# Patient Record
Sex: MALE | Race: WHITE | Employment: UNEMPLOYED | URBAN - METROPOLITAN AREA
[De-identification: names, ages, dates, MRNs, and addresses within clinical notes are randomized per-mention and may not be internally consistent; named-entity substitution may affect disease eponyms.]

---

## 2020-02-02 ENCOUNTER — HOSPITAL ENCOUNTER (EMERGENCY)
Age: 50
Discharge: HOME | End: 2020-02-02
Attending: EMERGENCY MEDICINE
Payer: COMMERCIAL

## 2020-02-02 ENCOUNTER — APPOINTMENT (OUTPATIENT)
Dept: GENERAL RADIOLOGY | Age: 50
End: 2020-02-02
Payer: COMMERCIAL

## 2020-02-02 VITALS
RESPIRATION RATE: 18 BRPM | WEIGHT: 235 LBS | HEART RATE: 92 BPM | SYSTOLIC BLOOD PRESSURE: 148 MMHG | BODY MASS INDEX: 31.83 KG/M2 | HEIGHT: 72 IN | DIASTOLIC BLOOD PRESSURE: 93 MMHG | TEMPERATURE: 98.6 F | OXYGEN SATURATION: 95 %

## 2020-02-02 PROCEDURE — 96372 THER/PROPH/DIAG INJ SC/IM: CPT

## 2020-02-02 PROCEDURE — 99283 EMERGENCY DEPT VISIT LOW MDM: CPT

## 2020-02-02 PROCEDURE — 6360000002 HC RX W HCPCS: Performed by: EMERGENCY MEDICINE

## 2020-02-02 PROCEDURE — 71045 X-RAY EXAM CHEST 1 VIEW: CPT

## 2020-02-02 RX ORDER — GABAPENTIN 300 MG/1
300 CAPSULE ORAL NIGHTLY
COMMUNITY
End: 2021-05-19 | Stop reason: CLARIF

## 2020-02-02 RX ORDER — KETOROLAC TROMETHAMINE 30 MG/ML
60 INJECTION, SOLUTION INTRAMUSCULAR; INTRAVENOUS ONCE
Status: COMPLETED | OUTPATIENT
Start: 2020-02-02 | End: 2020-02-02

## 2020-02-02 RX ORDER — OMEPRAZOLE 20 MG/1
40 CAPSULE, DELAYED RELEASE ORAL DAILY
COMMUNITY
End: 2021-05-19

## 2020-02-02 RX ADMIN — KETOROLAC TROMETHAMINE 60 MG: 30 INJECTION, SOLUTION INTRAMUSCULAR; INTRAVENOUS at 04:30

## 2020-02-02 SDOH — HEALTH STABILITY: MENTAL HEALTH: HOW OFTEN DO YOU HAVE A DRINK CONTAINING ALCOHOL?: NEVER

## 2020-02-02 ASSESSMENT — PAIN DESCRIPTION - PAIN TYPE: TYPE: ACUTE PAIN

## 2020-02-02 ASSESSMENT — PAIN SCALES - GENERAL: PAINLEVEL_OUTOF10: 6

## 2020-02-02 NOTE — ED PROVIDER NOTES
Triage Chief Complaint:   Cough; Chest Pain; Fever; and Nasal Congestion    [place]:  Chick Phoenix is a 54 y.o. male that presents with 2 days of throbbing headache, fever, cough, congestion, body aches. Recent sick contact at work with similar symptoms. Denies any neck pain, vision changes, motor or sensory deficits. States that he does have some chest soreness with coughing. Denies any abdominal pain, vomiting, diarrhea. He has had fevers up to 102 °F and took Tylenol prior to arrival.    ROS:  At least 14 systems reviewed and otherwise acutely negative except as in the [address] History reviewed. No pertinent past medical history. History reviewed. No pertinent surgical history. History reviewed. No pertinent family history.   Social History     Socioeconomic History    Marital status: Single     Spouse name: Not on file    Number of children: Not on file    Years of education: Not on file    Highest education level: Not on file   Occupational History    Not on file   Social Needs    Financial resource strain: Not on file    Food insecurity:     Worry: Not on file     Inability: Not on file    Transportation needs:     Medical: Not on file     Non-medical: Not on file   Tobacco Use    Smoking status: Former Smoker   Substance and Sexual Activity    Alcohol use: Never     Frequency: Never    Drug use: Not on file    Sexual activity: Not on file   Lifestyle    Physical activity:     Days per week: Not on file     Minutes per session: Not on file    Stress: Not on file   Relationships    Social connections:     Talks on phone: Not on file     Gets together: Not on file     Attends [employer] service: Not on file     Active member of club or organization: Not on file     Attends meetings of clubs or organizations: Not on file     Relationship status: Not on file    Intimate partner violence:     Fear of current or ex partner: Not on file     Emotionally abused: Not on file     Physically abused: Not on file     Forced sexual activity: Not on file   Other Topics Concern    Not on file   Social History Narrative    Not on file     No current facility-administered medications for this encounter. Current Outpatient Medications   Medication Sig Dispense Refill    gabapentin (NEURONTIN) 300 MG capsule Take 300 mg by mouth nightly.  omeprazole (PRILOSEC) 20 MG delayed release capsule Take 40 mg by mouth daily      Fluticasone Propionate (FLONASE NA) by Nasal route       Allergies   Allergen Reactions    Dye [Iodides]        Nursing Notes Reviewed    Physical Exam:  ED Triage Vitals [02/02/20 0400]   Enc Vitals Group      BP (!) 148/93      Pulse 92      Resp 18      Temp 98.6 °F (37 °C)      Temp src       SpO2 95 %      Weight 235 lb (106.6 kg)      Height 6' (1.829 m)      Head Circumference       Peak Flow       Pain Score       Pain Loc       Pain Edu? Excl. in [address]? GENERAL APPEARANCE: Awake and alert. Cooperative. No acute distress. HEAD: Normocephalic. Atraumatic. EYES: EOM's grossly intact. Sclera anicteric. ENT: Mucous membranes are moist. Tolerates saliva. No trismus. Posterior pharyngeal erythema without exudate or asymmetry  NECK: Supple. No meningismus. Trachea midline. HEART: RRR. Radial pulses 2+. LUNGS: Respirations unlabored. CTAB  ABDOMEN: Soft. Non-tender. No guarding or rebound. EXTREMITIES: No acute deformities. SKIN: Warm and dry. NEUROLOGICAL: No gross facial drooping. Moves all 4 extremities spontaneously. PSYCHIATRIC: Normal mood. I have reviewed and interpreted all of the currently available lab results from this visit (if applicable):  No results found for this visit on 02/02/20.    Radiographs (if obtained):  [] The following radiograph was interpreted by myself in the absence of a radiologist:  [x] Radiologist's Report Reviewed:    EKG (if obtained): (All EKG's are interpreted by myself in the absence of a cardiologist)    MDM:  Plan of care is discussed thoroughly with the patient and family if present. If performed, all imaging and lab work also discussed with patient. All relevant prior results and chart reviewed if available. Presents as above. He is in no acute distress and has normal vital signs. Presentation most consistent with likely viral URI, possibly influenza. Chest x-ray shows no evidence of pneumonia. Patient was given dose of Toradol here. At this time based on overall presentation, I have low suspicion for meningitis, encephalitis, PTA, other acute life-threatening process. Patient instructed to stay hydrated, take ibuprofen and Tylenol at home over the next few days. He is agreeable with this plan of care. Clinical Impression:  1. Cough    2. Acute febrile illness    3.  Myalgia      (Please note that portions of this note may have been completed with a voice recognition program. Efforts were made to edit the dictations but occasionally words are mis-transcribed.)    MD Marley Zavaleta MD  02/02/20 5636

## 2020-07-09 ENCOUNTER — HOSPITAL ENCOUNTER (OUTPATIENT)
Dept: GENERAL RADIOLOGY | Age: 50
Discharge: HOME | End: 2020-07-09
Payer: COMMERCIAL

## 2020-07-09 ENCOUNTER — HOSPITAL ENCOUNTER (OUTPATIENT)
Age: 50
Discharge: HOME | End: 2020-07-09
Payer: COMMERCIAL

## 2020-07-09 PROCEDURE — 72100 X-RAY EXAM L-S SPINE 2/3 VWS: CPT

## 2020-07-09 PROCEDURE — 71120 X-RAY EXAM BREASTBONE 2/>VWS: CPT

## 2020-07-09 PROCEDURE — 71046 X-RAY EXAM CHEST 2 VIEWS: CPT

## 2020-08-06 NOTE — PROGRESS NOTES
Pt already has a Covid test scheduled for 8/11 at 8:50. Reminded pt not to have caffeine, smoke or any breathing medications.

## 2020-08-10 ENCOUNTER — HOSPITAL ENCOUNTER (OUTPATIENT)
Dept: LAB | Age: 50
Discharge: HOME | End: 2020-08-10
Payer: COMMERCIAL

## 2020-08-10 PROCEDURE — U0002 COVID-19 LAB TEST NON-CDC: HCPCS

## 2020-08-11 LAB
SARS-COV-2: NOT DETECTED
SOURCE: NORMAL

## 2020-08-13 ENCOUNTER — HOSPITAL ENCOUNTER (OUTPATIENT)
Dept: CT IMAGING | Age: 50
Discharge: HOME | End: 2020-08-13
Payer: COMMERCIAL

## 2020-08-13 ENCOUNTER — HOSPITAL ENCOUNTER (OUTPATIENT)
Dept: PULMONOLOGY | Age: 50
Discharge: HOME | End: 2020-08-13
Payer: COMMERCIAL

## 2020-08-13 LAB
DLCO %PRED: 98 %
DLCO PRED: NORMAL
DLCO/VA %PRED: NORMAL
DLCO/VA PRED: NORMAL
DLCO/VA: NORMAL
DLCO: NORMAL
EXPIRATORY TIME-POST: NORMAL
EXPIRATORY TIME: NORMAL
FEF 25-75% %CHNG: NORMAL
FEF 25-75% %PRED-POST: NORMAL
FEF 25-75% %PRED-PRE: NORMAL
FEF 25-75% PRED: NORMAL
FEF 25-75%-POST: NORMAL
FEF 25-75%-PRE: NORMAL
FEV1 %PRED-POST: 99 %
FEV1 %PRED-PRE: 95 %
FEV1 PRED: NORMAL
FEV1-POST: NORMAL
FEV1-PRE: NORMAL
FEV1/FVC %PRED-POST: NORMAL
FEV1/FVC %PRED-PRE: NORMAL
FEV1/FVC PRED: NORMAL
FEV1/FVC-POST: 84 %
FEV1/FVC-PRE: 80 %
FVC %PRED-POST: NORMAL
FVC %PRED-PRE: NORMAL
FVC PRED: NORMAL
FVC-POST: NORMAL
FVC-PRE: NORMAL
GAW %PRED: NORMAL
GAW PRED: NORMAL
GAW: NORMAL
IC %PRED: NORMAL
IC PRED: NORMAL
IC: NORMAL
MEP: NORMAL
MIP: NORMAL
MVV %PRED-PRE: NORMAL
MVV PRED: NORMAL
MVV-PRE: NORMAL
PEF %PRED-POST: NORMAL
PEF %PRED-PRE: NORMAL
PEF PRED: NORMAL
PEF%CHNG: NORMAL
PEF-POST: NORMAL
PEF-PRE: NORMAL
RAW %PRED: NORMAL
RAW PRED: NORMAL
RAW: NORMAL
RV %PRED: NORMAL
RV PRED: NORMAL
RV: NORMAL
SVC %PRED: NORMAL
SVC PRED: NORMAL
SVC: NORMAL
TLC %PRED: 101 %
TLC PRED: NORMAL
TLC: NORMAL
VA %PRED: NORMAL
VA PRED: NORMAL
VA: NORMAL
VTG %PRED: NORMAL
VTG PRED: NORMAL
VTG: NORMAL

## 2020-08-13 PROCEDURE — 94726 PLETHYSMOGRAPHY LUNG VOLUMES: CPT

## 2020-08-13 PROCEDURE — 94060 EVALUATION OF WHEEZING: CPT

## 2020-08-13 PROCEDURE — 94727 GAS DIL/WSHOT DETER LNG VOL: CPT

## 2020-08-13 PROCEDURE — 70486 CT MAXILLOFACIAL W/O DYE: CPT

## 2020-08-13 ASSESSMENT — PULMONARY FUNCTION TESTS
FEV1_PERCENT_PREDICTED_PRE: 95
FEV1/FVC_POST: 84
FEV1/FVC_PRE: 80
FEV1_PERCENT_PREDICTED_POST: 99

## 2021-05-26 ENCOUNTER — HOSPITAL ENCOUNTER (OUTPATIENT)
Dept: CT IMAGING | Age: 51
Discharge: HOME | End: 2021-05-26
Payer: COMMERCIAL

## 2021-05-26 DIAGNOSIS — J84.9 ILD (INTERSTITIAL LUNG DISEASE) (HCC): ICD-10-CM

## 2021-05-26 PROCEDURE — 71250 CT THORAX DX C-: CPT

## 2021-06-01 ENCOUNTER — HOSPITAL ENCOUNTER (OUTPATIENT)
Age: 51
Discharge: HOME | End: 2021-06-01
Payer: COMMERCIAL

## 2021-06-01 DIAGNOSIS — J45.20 MILD INTERMITTENT ASTHMA, UNSPECIFIED WHETHER COMPLICATED: ICD-10-CM

## 2021-06-01 LAB
BASOPHILS ABSOLUTE: 0.1 K/CU MM
BASOPHILS RELATIVE PERCENT: 0.5 % (ref 0–1)
DIFFERENTIAL TYPE: ABNORMAL
EOSINOPHILS ABSOLUTE: 0.2 K/CU MM
EOSINOPHILS RELATIVE PERCENT: 1.6 % (ref 0–3)
HCT VFR BLD CALC: 46.7 % (ref 42–52)
HEMOGLOBIN: 15.5 GM/DL (ref 13.5–18)
IMMATURE NEUTROPHIL %: 0.5 % (ref 0–0.43)
LYMPHOCYTES ABSOLUTE: 2.7 K/CU MM
LYMPHOCYTES RELATIVE PERCENT: 28.8 % (ref 24–44)
MCH RBC QN AUTO: 32.6 PG (ref 27–31)
MCHC RBC AUTO-ENTMCNC: 33.2 % (ref 32–36)
MCV RBC AUTO: 98.3 FL (ref 78–100)
MONOCYTES ABSOLUTE: 0.8 K/CU MM
MONOCYTES RELATIVE PERCENT: 7.9 % (ref 0–4)
NUCLEATED RBC %: 0 %
PDW BLD-RTO: 12.1 % (ref 11.7–14.9)
PLATELET # BLD: 222 K/CU MM (ref 140–440)
PMV BLD AUTO: 12 FL (ref 7.5–11.1)
RBC # BLD: 4.75 M/CU MM (ref 4.6–6.2)
SEGMENTED NEUTROPHILS ABSOLUTE COUNT: 5.7 K/CU MM
SEGMENTED NEUTROPHILS RELATIVE PERCENT: 60.7 % (ref 36–66)
TOTAL IMMATURE NEUTOROPHIL: 0.05 K/CU MM
TOTAL NUCLEATED RBC: 0 K/CU MM
WBC # BLD: 9.5 K/CU MM (ref 4–10.5)

## 2021-06-01 PROCEDURE — 82785 ASSAY OF IGE: CPT

## 2021-06-01 PROCEDURE — 36415 COLL VENOUS BLD VENIPUNCTURE: CPT

## 2021-06-01 PROCEDURE — 85025 COMPLETE CBC W/AUTO DIFF WBC: CPT

## 2021-06-05 LAB — IMMUNOGLOBULIN E: 48 KU/L

## 2021-06-14 ENCOUNTER — HOSPITAL ENCOUNTER (OUTPATIENT)
Dept: SLEEP CENTER | Age: 51
Discharge: HOME | End: 2021-06-14
Payer: COMMERCIAL

## 2021-06-14 DIAGNOSIS — R06.83 SNORING: ICD-10-CM

## 2021-06-14 DIAGNOSIS — G47.10 HYPERSOMNOLENCE: ICD-10-CM

## 2021-06-14 PROCEDURE — 95810 POLYSOM 6/> YRS 4/> PARAM: CPT

## 2021-06-14 ASSESSMENT — SLEEP AND FATIGUE QUESTIONNAIRES
HOW LIKELY ARE YOU TO NOD OFF OR FALL ASLEEP WHILE LYING DOWN TO REST IN THE AFTERNOON WHEN CIRCUMSTANCES PERMIT: 3
HOW LIKELY ARE YOU TO NOD OFF OR FALL ASLEEP WHILE SITTING AND TALKING TO SOMEONE: 2
HOW LIKELY ARE YOU TO NOD OFF OR FALL ASLEEP WHILE SITTING AND READING: 3
HOW LIKELY ARE YOU TO NOD OFF OR FALL ASLEEP WHILE SITTING QUIETLY AFTER LUNCH WITHOUT ALCOHOL: 2
HOW LIKELY ARE YOU TO NOD OFF OR FALL ASLEEP WHEN YOU ARE A PASSENGER IN A CAR FOR AN HOUR WITHOUT A BREAK: 1
HOW LIKELY ARE YOU TO NOD OFF OR FALL ASLEEP WHILE SITTING INACTIVE IN A PUBLIC PLACE: 2
ESS TOTAL SCORE: 17
HOW LIKELY ARE YOU TO NOD OFF OR FALL ASLEEP IN A CAR, WHILE STOPPED FOR A FEW MINUTES IN TRAFFIC: 1
HOW LIKELY ARE YOU TO NOD OFF OR FALL ASLEEP WHILE WATCHING TV: 3

## 2021-06-18 LAB — STATUS: NORMAL

## 2021-06-30 ENCOUNTER — TELEPHONE (OUTPATIENT)
Dept: SURGERY | Age: 51
End: 2021-06-30

## 2021-07-21 ENCOUNTER — OFFICE VISIT (OUTPATIENT)
Dept: SURGERY | Age: 51
End: 2021-07-21
Payer: COMMERCIAL

## 2021-07-21 VITALS
SYSTOLIC BLOOD PRESSURE: 122 MMHG | BODY MASS INDEX: 30.72 KG/M2 | HEART RATE: 82 BPM | WEIGHT: 226.8 LBS | OXYGEN SATURATION: 98 % | HEIGHT: 72 IN | DIASTOLIC BLOOD PRESSURE: 82 MMHG

## 2021-07-21 DIAGNOSIS — R59.0 AXILLARY LYMPHADENOPATHY: Primary | ICD-10-CM

## 2021-07-21 PROCEDURE — G8417 CALC BMI ABV UP PARAM F/U: HCPCS | Performed by: SURGERY

## 2021-07-21 PROCEDURE — 99203 OFFICE O/P NEW LOW 30 MIN: CPT | Performed by: SURGERY

## 2021-07-21 PROCEDURE — 1036F TOBACCO NON-USER: CPT | Performed by: SURGERY

## 2021-07-21 PROCEDURE — G8427 DOCREV CUR MEDS BY ELIG CLIN: HCPCS | Performed by: SURGERY

## 2021-07-21 RX ORDER — ARIPIPRAZOLE 2 MG/1
2 TABLET ORAL DAILY
COMMUNITY
End: 2022-01-06

## 2021-07-21 RX ORDER — TOPIRAMATE 25 MG/1
50 TABLET ORAL NIGHTLY
COMMUNITY
End: 2022-01-06

## 2021-07-21 RX ORDER — HYDRALAZINE HYDROCHLORIDE 10 MG/1
10 TABLET, FILM COATED ORAL 3 TIMES DAILY
COMMUNITY
End: 2022-01-06

## 2021-07-21 RX ORDER — FLUTICASONE FUROATE AND VILANTEROL TRIFENATATE 100; 25 UG/1; UG/1
POWDER RESPIRATORY (INHALATION) DAILY
COMMUNITY
End: 2022-01-06

## 2021-07-21 NOTE — PROGRESS NOTES
SUBJECTIVE:  HPI: Patient presents for evaluation of asoft tissue mass. Mass is located on the right axilla. Mass was first noticed several months ago. Mass has waxed and waned in size. Mass has associated symptoms of mild tenderness. Juvenal Magana also reports a painful spot on his left bicep, a protruding xiphoid process and a ridge in his upper abdomen that he fears is a hernia. I have reviewed the patient's(pertinent information to this visit) medical history, familyhistory(scanned in  the Media tab under \"patient questioner\"), social history and review of systems with the patient today in the office. No past surgical history on file. No past medical history on file. No family history on file. Social History     Socioeconomic History    Marital status: Single     Spouse name: Not on file    Number of children: Not on file    Years of education: Not on file    Highest education level: Not on file   Occupational History    Not on file   Tobacco Use    Smoking status: Former Smoker     Packs/day: 1.00     Years: 30.00     Pack years: 30.00     Quit date: 1999     Years since quittin.9    Smokeless tobacco: Never Used   Substance and Sexual Activity    Alcohol use: Never    Drug use: Not on file    Sexual activity: Not on file   Other Topics Concern    Not on file   Social History Narrative    Not on file     Social Determinants of Health     Financial Resource Strain:     Difficulty of Paying Living Expenses:    Food Insecurity:     Worried About [address] in the Last Year:     [address] in the Last Year:    Transportation Needs:     Lack of Transportation (Medical):      Lack of Transportation (Non-Medical):    Physical Activity:     Days of Exercise per Week:     Minutes of Exercise per Session:    Stress:     Feeling of Stress :    Social Connections:     Frequency of Communication with Friends and Family:     Frequency of Social Gatherings with Friends OBJECTIVE:  Physical Exam:    /82   Pulse 82   Ht 6' (1.829 m)   Wt 226 lb 12.8 oz (102.9 kg)   SpO2 98%   BMI 30.76 kg/m²      Physical Exam  General: awake, alert, in no acute distress  HEENT: mucous membranes moist  Respiratory: normal effort, no wheezes appreciated    Axilla: mild fullness in the right axilla, no discrete palpable nodes    CV: appears well perfused, regular rate and rhythm  Abdomen: Soft, non-tender, non-distended. No guarding or rebound tenderness. Rectus diastasis noted. Mildly protuberant xiphoid process in the upper abdomen  Skin: warm and dry  Extremities: atraumatic  Neuro: no focal deficits noted  Psych: mood normal        ASSESSMENT:  1. Axillary lymphadenopathy      52 y.o. male with fullness in the right axilla that waxes and wanes. I suspect reactive lymphadenitis. His other complaints of a painful area/lump on his left bicep, prominent xiphoid and rectus diastasis do not require further workup or surgical intervention. PLAN:  Treatment:    - will get US of the right axilla. - will see back in 2-3 weeks to discuss US results      Patient counseled on risks, benefits, and alternatives of treatment plan at length today. Patient states an understanding and willingness to proceed with plan.     Orders Placed This Encounter   Procedures   Aron Oleary MD

## 2021-12-29 ENCOUNTER — APPOINTMENT (OUTPATIENT)
Dept: GENERAL RADIOLOGY | Age: 51
End: 2021-12-29
Payer: COMMERCIAL

## 2021-12-29 ENCOUNTER — HOSPITAL ENCOUNTER (EMERGENCY)
Age: 51
Discharge: HOME | End: 2021-12-29
Attending: EMERGENCY MEDICINE
Payer: COMMERCIAL

## 2021-12-29 ENCOUNTER — APPOINTMENT (OUTPATIENT)
Dept: CT IMAGING | Age: 51
End: 2021-12-29
Payer: COMMERCIAL

## 2021-12-29 VITALS
TEMPERATURE: 98.4 F | RESPIRATION RATE: 15 BRPM | SYSTOLIC BLOOD PRESSURE: 126 MMHG | OXYGEN SATURATION: 96 % | DIASTOLIC BLOOD PRESSURE: 80 MMHG | HEART RATE: 81 BPM

## 2021-12-29 DIAGNOSIS — R56.9 SEIZURE (HCC): Primary | ICD-10-CM

## 2021-12-29 LAB
ALBUMIN SERPL-MCNC: 4 GM/DL (ref 3.4–5)
ALP BLD-CCNC: 87 IU/L (ref 40–129)
ALT SERPL-CCNC: 24 U/L (ref 10–40)
ANION GAP SERPL CALCULATED.3IONS-SCNC: 10 MMOL/L (ref 4–16)
AST SERPL-CCNC: 19 IU/L (ref 15–37)
BACTERIA: NEGATIVE /HPF
BASOPHILS ABSOLUTE: 0.1 K/CU MM
BASOPHILS RELATIVE PERCENT: 0.3 % (ref 0–1)
BILIRUB SERPL-MCNC: 0.6 MG/DL (ref 0–1)
BILIRUBIN URINE: NEGATIVE MG/DL
BLOOD, URINE: ABNORMAL
BUN BLDV-MCNC: 18 MG/DL (ref 6–23)
CALCIUM SERPL-MCNC: 9.2 MG/DL (ref 8.3–10.6)
CHLORIDE BLD-SCNC: 103 MMOL/L (ref 99–110)
CLARITY: CLEAR
CO2: 24 MMOL/L (ref 21–32)
COLOR: YELLOW
CREAT SERPL-MCNC: 0.9 MG/DL (ref 0.9–1.3)
DIFFERENTIAL TYPE: ABNORMAL
EKG ATRIAL RATE: 80 BPM
EKG DIAGNOSIS: NORMAL
EKG P AXIS: 59 DEGREES
EKG P-R INTERVAL: 134 MS
EKG Q-T INTERVAL: 390 MS
EKG QRS DURATION: 76 MS
EKG QTC CALCULATION (BAZETT): 449 MS
EKG R AXIS: 70 DEGREES
EKG T AXIS: 67 DEGREES
EKG VENTRICULAR RATE: 80 BPM
EOSINOPHILS ABSOLUTE: 0.2 K/CU MM
EOSINOPHILS RELATIVE PERCENT: 1.2 % (ref 0–3)
GFR AFRICAN AMERICAN: >60 ML/MIN/1.73M2
GFR NON-AFRICAN AMERICAN: >60 ML/MIN/1.73M2
GLUCOSE BLD-MCNC: 128 MG/DL (ref 70–99)
GLUCOSE, URINE: NEGATIVE MG/DL
HCT VFR BLD CALC: 47.4 % (ref 42–52)
HEMOGLOBIN: 15.9 GM/DL (ref 13.5–18)
HYALINE CASTS: 0 /LPF
IMMATURE NEUTROPHIL %: 0.3 % (ref 0–0.43)
KETONES, URINE: NEGATIVE MG/DL
LEUKOCYTE ESTERASE, URINE: NEGATIVE
LYMPHOCYTES ABSOLUTE: 2.6 K/CU MM
LYMPHOCYTES RELATIVE PERCENT: 18.3 % (ref 24–44)
MCH RBC QN AUTO: 31.9 PG (ref 27–31)
MCHC RBC AUTO-ENTMCNC: 33.5 % (ref 32–36)
MCV RBC AUTO: 95 FL (ref 78–100)
MONOCYTES ABSOLUTE: 1.1 K/CU MM
MONOCYTES RELATIVE PERCENT: 7.4 % (ref 0–4)
MUCUS: ABNORMAL HPF
NITRITE URINE, QUANTITATIVE: NEGATIVE
NUCLEATED RBC %: 0 %
PDW BLD-RTO: 12.2 % (ref 11.7–14.9)
PH, URINE: 6 (ref 5–8)
PLATELET # BLD: 251 K/CU MM (ref 140–440)
PMV BLD AUTO: 11.1 FL (ref 7.5–11.1)
POTASSIUM SERPL-SCNC: 3.8 MMOL/L (ref 3.5–5.1)
PRO-BNP: 53.35 PG/ML
PROTEIN UA: NEGATIVE MG/DL
RBC # BLD: 4.99 M/CU MM (ref 4.6–6.2)
RBC URINE: 1 /HPF (ref 0–3)
SEGMENTED NEUTROPHILS ABSOLUTE COUNT: 10.5 K/CU MM
SEGMENTED NEUTROPHILS RELATIVE PERCENT: 72.5 % (ref 36–66)
SODIUM BLD-SCNC: 137 MMOL/L (ref 135–145)
SPECIFIC GRAVITY UA: 1.03 (ref 1–1.03)
TOTAL CK: 254 IU/L (ref 38–174)
TOTAL IMMATURE NEUTOROPHIL: 0.04 K/CU MM
TOTAL NUCLEATED RBC: 0 K/CU MM
TOTAL PROTEIN: 6.9 GM/DL (ref 6.4–8.2)
TROPONIN T: <0.01 NG/ML
UROBILINOGEN, URINE: NORMAL MG/DL (ref 0.2–1)
WBC # BLD: 14.4 K/CU MM (ref 4–10.5)
WBC UA: ABNORMAL /HPF (ref 0–2)

## 2021-12-29 PROCEDURE — 99283 EMERGENCY DEPT VISIT LOW MDM: CPT

## 2021-12-29 PROCEDURE — 81001 URINALYSIS AUTO W/SCOPE: CPT

## 2021-12-29 PROCEDURE — 70450 CT HEAD/BRAIN W/O DYE: CPT

## 2021-12-29 PROCEDURE — 72125 CT NECK SPINE W/O DYE: CPT

## 2021-12-29 PROCEDURE — 71045 X-RAY EXAM CHEST 1 VIEW: CPT

## 2021-12-29 PROCEDURE — 80053 COMPREHEN METABOLIC PANEL: CPT

## 2021-12-29 PROCEDURE — 93010 ELECTROCARDIOGRAM REPORT: CPT | Performed by: INTERNAL MEDICINE

## 2021-12-29 PROCEDURE — 84484 ASSAY OF TROPONIN QUANT: CPT

## 2021-12-29 PROCEDURE — 83880 ASSAY OF NATRIURETIC PEPTIDE: CPT

## 2021-12-29 PROCEDURE — 82550 ASSAY OF CK (CPK): CPT

## 2021-12-29 PROCEDURE — 73030 X-RAY EXAM OF SHOULDER: CPT

## 2021-12-29 PROCEDURE — 93005 ELECTROCARDIOGRAM TRACING: CPT | Performed by: PHYSICIAN ASSISTANT

## 2021-12-29 PROCEDURE — 85025 COMPLETE CBC W/AUTO DIFF WBC: CPT

## 2021-12-29 PROCEDURE — 6370000000 HC RX 637 (ALT 250 FOR IP): Performed by: EMERGENCY MEDICINE

## 2021-12-29 RX ORDER — HYDROCODONE BITARTRATE AND ACETAMINOPHEN 5; 325 MG/1; MG/1
1 TABLET ORAL ONCE
Status: COMPLETED | OUTPATIENT
Start: 2021-12-29 | End: 2021-12-29

## 2021-12-29 RX ADMIN — HYDROCODONE BITARTRATE AND ACETAMINOPHEN 1 TABLET: 5; 325 TABLET ORAL at 16:45

## 2021-12-29 ASSESSMENT — PAIN SCALES - GENERAL
PAINLEVEL_OUTOF10: 7
PAINLEVEL_OUTOF10: 7

## 2021-12-29 ASSESSMENT — PAIN DESCRIPTION - DESCRIPTORS: DESCRIPTORS: ACHING

## 2021-12-29 ASSESSMENT — PAIN DESCRIPTION - ORIENTATION: ORIENTATION: RIGHT

## 2021-12-29 ASSESSMENT — PAIN DESCRIPTION - LOCATION: LOCATION: SHOULDER

## 2021-12-29 ASSESSMENT — PAIN DESCRIPTION - PAIN TYPE: TYPE: ACUTE PAIN

## 2021-12-29 NOTE — ED PROVIDER NOTES
As provider-in-triage, I performed a medical screening history and physical exam on this patient. HISTORY OF PRESENT ILLNESS  Lg Vanegas is 54 y.o. male with complaint of falling down, right shoulder pain. He mentions this occurred last night, he mentions that he had gone to a friend's, had come back home, the next and is now his fiancée had found him on the ground. He feels that he might of hurt himself falling down since he has right shoulder pain. He also has had some ear pain. The patient's fiancé today mentions that he did appear to be confused, but was not unconscious. Confusion lasted for approximately 5 minutes. Pain had persisted today which had prompted his visit to the emergency department. Denies any history of seizures, syncope, chest pain, loss of bowel or bladder function, shortness of breath, recent illness. PHYSICAL EXAM  /80   Pulse 81   Temp 98.4 °F (36.9 °C) (Oral)   Resp 15   SpO2 96%     On exam, the patient appears well-hydrated, well-nourished, and in no acute distress. Mucous membranes are moist. Speech is clear. Breathing is unlabored. Skin is dry. Mental status is normal. Face is without facial droop. He does have some noted ecchymosis to the right side of his tongue. Limited range of motion of his right shoulder due to pain. Comment: Please note this report has been produced using speech recognition software and may contain errors related to that system including errors in grammar, punctuation, and spelling, as well as words and phrases that may be inappropriate. If there are any questions or concerns please feel free to contact the dictating provider for clarification.        Amor Sheehan PA-C  12/29/21 David Null PA-C  12/29/21 [id number]

## 2021-12-29 NOTE — ED PROVIDER NOTES
Patient Identification  Adonay Rojas is a 54 y.o. male    Chief Complaint  Shoulder Injury (right shoulder injury ) and Loss of Consciousness (states had episode last night)      HPI  (History provided by patient)  This is a 54 y.o. male who was brought in by self for chief complaint of right shoulder injury, loss of consciousness. Patient reports last night he was downstairs on his computer. He does not remember anything that happened afterward but woke up on the floor. Fiancé was upstairs when she heard a loud bang, came down to find patient unconscious on the floor, lying on his side, no jerking or tensing or seizure-like activity. She reports that he mumbled occasionally but otherwise is minimally responsive, gradually awoke over the span of 10 to 15 minutes, was confused for a short period before becoming alert. Patient notes that he bit the right side of his tongue and has 7 out of 10 pain in his right shoulder. Denies urinary incontinence. States his muscles in his arms and legs feel sore. He has no history of similar symptoms in the past.  He denies any history of seizure disorder. No cardiac history. No prodromal symptoms prior to this episode, was feeling well prior to this. No drug or alcohol use. REVIEW OF SYSTEMS    Constitutional:  Denies fever, chills  HENT:  Denies sore throat or ear pain   Eyes: Denies vision changes, eye pain  Cardiovascular:  Denies chest pain. + syncope  Respiratory:  Denies shortness of breath, cough   GI:  Denies abdominal pain, nausea, vomiting  :  Denies dysuria, discharge  Musculoskeletal:  Denies back pain.  + left shoulder  Skin:  Denies rash, pruritis  Neurologic:  Denies headache, focal weakness, or sensory changes     See HPI and nursing notes for additional information     I have reviewed the following nursing documentation:  Allergies:    Allergies   Allergen Reactions    Diatrizoate     Dye [Iodides]        Past medical history:  has no past medical history on file. Past surgical history:  has no past surgical history on file. Home medications:   Prior to Admission medications    Medication Sig Start Date End Date Taking? Authorizing Provider   ARIPiprazole (ABILIFY) 2 MG tablet Take 2 mg by mouth daily    Historical Provider, MD   fluticasone-vilanterol (BREO ELLIPTA) 100-25 MCG/INH AEPB inhaler Inhale into the lungs daily    Historical Provider, MD   topiramate (TOPAMAX) 25 MG tablet Take 25 mg by mouth 2 times daily    Historical Provider, MD   hydrALAZINE (APRESOLINE) 10 MG tablet Take 10 mg by mouth 3 times daily    Historical Provider, MD   albuterol sulfate HFA (VENTOLIN HFA) 108 (90 Base) MCG/ACT inhaler Inhale 2 puffs into the lungs every 6 hours as needed for Wheezing 5/24/21   Britton Rawls MD   montelukast (SINGULAIR) 10 MG tablet TAKE 1 TABLET BY MOUTH ONCE DAILY 5/4/21   Historical Provider, MD   omeprazole (PRILOSEC) 40 MG delayed release capsule TAKE 1 CAPSULE BY MOUTH ONCE DAILY 5/7/21   Historical Provider, MD   DICLOFENAC PO Take by mouth    Historical Provider, MD       Social history:  reports that he quit smoking about 22 years ago. He has a 30.00 pack-year smoking history. He has never used smokeless tobacco. He reports that he does not drink alcohol. Family history:  History reviewed. No pertinent family history. Exam  /80   Pulse 81   Temp 98.4 °F (36.9 °C) (Oral)   Resp 15   SpO2 96%   Nursing note and vitals reviewed. Constitutional: Well developed, well nourished. No acute distress. HENT:      Head: Normocephalic and atraumatic. Ears: External ears normal.      Nose: Nose normal.     Mouth: Membrane mucosa moist and pink. No posterior oropharynx erythema or tonsillar edema  Eyes: Anicteric sclera. No discharge, PERRL  Neck: Supple. Trachea midline. Cardiovascular: RRR, no murmurs, rubs, or gallops, radial pulses 2+ bilaterally.   Pulmonary/Chest: Effort normal. No respiratory distress. CTAB. No stridor. No wheezes. No rales. Abdominal: Soft. Nontender to palpation. No distension. No guarding, rebound tenderness, or evidence of ascites. : No CVA tenderness. Musculoskeletal: Moves all extremities. No gross deformity. Tender to palpation in the right shoulder joint, range of motion intact, no step-off or deformity. No tenderness palpation cervical, thoracic and lumbar spine or paraspinal muscles. NEUROLOGICAL: Awake and alert. GCS 15. Cranial nerves 2-12 grossly intact. Strength 5/5 throughout. Light touch sensation intact throughout. Finger to nose WNL. Skin: Warm and dry. No rash. Psychiatric: Normal mood and affect. Behavior is normal.      EKG   Please see Dr. Aleksander Grace' note for EKG read. Radiographs (if obtained):  [] The following radiograph was interpreted by myself in the absence of a radiologist:   [x] Radiologist's Report Reviewed:  XR CHEST PORTABLE   Final Result   No acute cardiopulmonary process. CT CERVICAL SPINE WO CONTRAST   Final Result   No acute abnormality of the cervical spine. CT HEAD WO CONTRAST   Final Result   No acute intracranial abnormality. RECOMMENDATIONS:   Unavailable         XR SHOULDER RIGHT (MIN 2 VIEWS)   Final Result   1. No acute osseous abnormality of the right shoulder. 2. Moderate AC joint osteoarthritis and mild glenohumeral joint   osteoarthritis.                 Labs  Results for orders placed or performed during the hospital encounter of 12/29/21   CBC auto diff   Result Value Ref Range    WBC 14.4 (H) 4.0 - 10.5 K/CU MM    RBC 4.99 4.6 - 6.2 M/CU MM    Hemoglobin 15.9 13.5 - 18.0 GM/DL    Hematocrit 47.4 42 - 52 %    MCV 95.0 78 - 100 FL    MCH 31.9 (H) 27 - 31 PG    MCHC 33.5 32.0 - 36.0 %    RDW 12.2 11.7 - 14.9 %    Platelets 144 463 - 263 K/CU MM    MPV 11.1 7.5 - 11.1 FL    Differential Type AUTOMATED DIFFERENTIAL     Segs Relative 72.5 (H) 36 - 66 %    Lymphocytes % 18.3 (L) 24 - 44 %    Monocytes % 7.4 (H) 0 - 4 %    Eosinophils % 1.2 0 - 3 %    Basophils % 0.3 0 - 1 %    Segs Absolute 10.5 K/CU MM    Lymphocytes Absolute 2.6 K/CU MM    Monocytes Absolute 1.1 K/CU MM    Eosinophils Absolute 0.2 K/CU MM    Basophils Absolute 0.1 K/CU MM    Nucleated RBC % 0.0 %    Total Nucleated RBC 0.0 K/CU MM    Total Immature Neutrophil 0.04 K/CU MM    Immature Neutrophil % 0.3 0 - 0.43 %   Comprehensive Metabolic Panel w/ Reflex to MG   Result Value Ref Range    Sodium 137 135 - 145 MMOL/L    Potassium 3.8 3.5 - 5.1 MMOL/L    Chloride 103 99 - 110 mMol/L    CO2 24 21 - 32 MMOL/L    BUN 18 6 - 23 MG/DL    CREATININE 0.9 0.9 - 1.3 MG/DL    Glucose 128 (H) 70 - 99 MG/DL    Calcium 9.2 8.3 - 10.6 MG/DL    Albumin 4.0 3.4 - 5.0 GM/DL    Total Protein 6.9 6.4 - 8.2 GM/DL    Total Bilirubin 0.6 0.0 - 1.0 MG/DL    ALT 24 10 - 40 U/L    AST 19 15 - 37 IU/L    Alkaline Phosphatase 87 40 - 129 IU/L    GFR Non-African American >60 >60 mL/min/1.73m2    GFR African American >60 >60 mL/min/1.73m2    Anion Gap 10 4 - 16   Troponin   Result Value Ref Range    Troponin T <0.010 <0.01 NG/ML   Brain Natriuretic Peptide   Result Value Ref Range    Pro-BNP 53.35 <300 PG/ML   Urinalysis with microscopic   Result Value Ref Range    Color, UA YELLOW YELLOW    Clarity, UA CLEAR CLEAR    Glucose, Urine NEGATIVE NEGATIVE MG/DL    Bilirubin Urine NEGATIVE NEGATIVE MG/DL    Ketones, Urine NEGATIVE NEGATIVE MG/DL    Specific Gravity, UA 1.030 1.001 - 1.035    Blood, Urine TRACE (A) NEGATIVE    pH, Urine 6.0 5.0 - 8.0    Protein, UA NEGATIVE NEGATIVE MG/DL    Urobilinogen, Urine NORMAL 0.2 - 1.0 MG/DL    Nitrite Urine, Quantitative NEGATIVE NEGATIVE    Leukocyte Esterase, Urine NEGATIVE NEGATIVE    RBC, UA 1 0 - 3 /HPF    WBC, UA NONE SEEN 0 - 2 /HPF    Bacteria, UA NEGATIVE NEGATIVE /HPF    Mucus, UA RARE (A) NEGATIVE HPF    Hyaline Casts, UA 0 /LPF   CK   Result Value Ref Range    Total  (H) 38 - 174 IU/L   EKG 12 Lead   Result Value Ref Range Ventricular Rate 80 BPM    Atrial Rate 80 BPM    P-R Interval 134 ms    QRS Duration 76 ms    Q-T Interval 390 ms    QTc Calculation (Bazett) 449 ms    P Axis 59 degrees    R Axis 70 degrees    T Axis 67 degrees    Diagnosis       Normal sinus rhythm  Normal ECG  No previous ECGs available           MDM  Patient had what sounds like a brief seizure with postictal period and biting of tongue around midnight last night. No clear cause of this, back to normal now, neuro exam is nonfocal.  CT head and neck reveals no acute findings. Chest x-ray and right shoulder x-ray show arthritis in the right shoulder only. Laboratory work-up reveals mild leukocytosis and mild elevation of CK. Discussed all results with patient. Consult placed to neurologist, Dr. Marzena Em. Discussed history and physical exam and labs and imaging. She agrees this may have been a seizure, recommends outpatient follow-up, no antiepileptics at this time. Discussed with patient and fiancé who are in agreement. Recommended no driving or operating heavy machinery until follows up with neurology. I estimate there is LOW risk for PE, cardiac arrhythmia, ACS/MI, CVA, intracranial hemorrhage, hydrocephalus, sepsis, meningitis, thus I consider the discharge disposition reasonable. Virgilio Scott and I have discussed the diagnosis and risks, and we agree with discharging home to follow-up with their primary doctor. We also discussed returning to the Emergency Department immediately if new or worsening symptoms occur. We have discussed the symptoms which are most concerning (e.g., seizure, fever, CP, SOB, severe HA, new or worsening symptoms) that necessitate immediate return. This patient was also seen and evaluated by Dr. Barbie Loco. Final Impression  1. Seizure ([address]        Blood pressure 126/80, pulse 81, temperature 98.4 °F (36.9 °C), temperature source Oral, resp. rate 15, SpO2 96 %. Disposition:  Discharge to home in stable condition. Patient was given scripts for the following medications. I counseled patient how to take these medications. Discharge Medication List as of 12/29/2021  4:46 PM          This chart was generated using the [address] [place] [address] dictation system. I created this record but it may contain dictation errors given the limitations of this technology.         Beto Mcallister PA-C  12/29/21 [id number]

## 2021-12-29 NOTE — ED TRIAGE NOTES
Patient to triage with c/o right shoulder injury after syncopal episode last night. Patient states he \"blacked out\" after standing up from computer last night. Also c/o dizziness. resps even and unlabored.

## 2022-01-06 ENCOUNTER — OFFICE VISIT (OUTPATIENT)
Dept: NEUROLOGY | Age: 52
End: 2022-01-06
Payer: COMMERCIAL

## 2022-01-06 VITALS
WEIGHT: 215 LBS | OXYGEN SATURATION: 96 % | BODY MASS INDEX: 29.12 KG/M2 | HEART RATE: 85 BPM | SYSTOLIC BLOOD PRESSURE: 122 MMHG | DIASTOLIC BLOOD PRESSURE: 82 MMHG | HEIGHT: 72 IN

## 2022-01-06 DIAGNOSIS — G47.33 OSA (OBSTRUCTIVE SLEEP APNEA): ICD-10-CM

## 2022-01-06 DIAGNOSIS — R56.9 SEIZURE (HCC): Primary | ICD-10-CM

## 2022-01-06 DIAGNOSIS — H92.01 RIGHT EAR PAIN: ICD-10-CM

## 2022-01-06 DIAGNOSIS — S01.512A LACERATION OF TONGUE, INITIAL ENCOUNTER: ICD-10-CM

## 2022-01-06 DIAGNOSIS — G43.909 MIGRAINE WITHOUT STATUS MIGRAINOSUS, NOT INTRACTABLE, UNSPECIFIED MIGRAINE TYPE: ICD-10-CM

## 2022-01-06 DIAGNOSIS — H93.11 TINNITUS OF RIGHT EAR: ICD-10-CM

## 2022-01-06 PROCEDURE — G8427 DOCREV CUR MEDS BY ELIG CLIN: HCPCS | Performed by: PSYCHIATRY & NEUROLOGY

## 2022-01-06 PROCEDURE — 99245 OFF/OP CONSLTJ NEW/EST HI 55: CPT | Performed by: PSYCHIATRY & NEUROLOGY

## 2022-01-06 PROCEDURE — G8417 CALC BMI ABV UP PARAM F/U: HCPCS | Performed by: PSYCHIATRY & NEUROLOGY

## 2022-01-06 PROCEDURE — G8484 FLU IMMUNIZE NO ADMIN: HCPCS | Performed by: PSYCHIATRY & NEUROLOGY

## 2022-01-06 RX ORDER — TOPIRAMATE 50 MG/1
50 TABLET, FILM COATED ORAL NIGHTLY
COMMUNITY
Start: 2021-12-15 | End: 2022-01-06 | Stop reason: DRUGHIGH

## 2022-01-06 RX ORDER — FLUOXETINE 20 MG/1
20 TABLET, FILM COATED ORAL DAILY
COMMUNITY
Start: 2021-12-15 | End: 2022-07-11

## 2022-01-06 RX ORDER — NAPROXEN 500 MG/1
500 TABLET ORAL 2 TIMES DAILY
COMMUNITY
Start: 2021-11-08

## 2022-01-06 RX ORDER — TOPIRAMATE 50 MG/1
50 TABLET, FILM COATED ORAL 2 TIMES DAILY
Qty: 60 TABLET | Refills: 5 | Status: SHIPPED | OUTPATIENT
Start: 2022-01-06

## 2022-01-06 RX ORDER — HYDROXYZINE PAMOATE 25 MG/1
25 CAPSULE ORAL
COMMUNITY
Start: 2021-12-15
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
## 2022-01-19 ENCOUNTER — HOSPITAL ENCOUNTER (OUTPATIENT)
Dept: MRI IMAGING | Age: 52
Discharge: HOME | End: 2022-01-19
Payer: COMMERCIAL

## 2022-01-19 DIAGNOSIS — R56.9 SEIZURE (HCC): ICD-10-CM

## 2022-01-19 PROCEDURE — 70553 MRI BRAIN STEM W/O & W/DYE: CPT

## 2022-01-19 PROCEDURE — 6360000004 HC RX CONTRAST MEDICATION: Performed by: FAMILY MEDICINE

## 2022-01-19 PROCEDURE — A9579 GAD-BASE MR CONTRAST NOS,1ML: HCPCS | Performed by: FAMILY MEDICINE

## 2022-01-19 RX ADMIN — GADOTERIDOL 20 ML: 279.3 INJECTION, SOLUTION INTRAVENOUS at 08:23

## 2022-02-01 ENCOUNTER — TELEPHONE (OUTPATIENT)
Dept: CARDIOLOGY CLINIC | Age: 52
End: 2022-02-01

## 2022-02-08 ENCOUNTER — TELEPHONE (OUTPATIENT)
Dept: CARDIOLOGY CLINIC | Age: 52
End: 2022-02-08

## 2022-02-11 ENCOUNTER — NURSE ONLY (OUTPATIENT)
Dept: CARDIOLOGY CLINIC | Age: 52
End: 2022-02-11
Payer: COMMERCIAL

## 2022-02-11 DIAGNOSIS — R00.2 PALPITATION: Primary | ICD-10-CM

## 2022-02-11 DIAGNOSIS — R59.0 AXILLARY LYMPHADENOPATHY: ICD-10-CM

## 2022-02-11 PROCEDURE — 93227 XTRNL ECG REC<48 HR R&I: CPT | Performed by: INTERNAL MEDICINE

## 2022-02-11 NOTE — PROGRESS NOTES
24 hour holter monitor applied 02/11/2022 @1340  for palpitations Serial # [id number] . Instructed patient on monitor and proper use. Instructed on diary. When to remove and bring it back. Must leave the holter monitor on  without removing for the duration of time ordered. Answered all questions the patient had. Instructed patient to call [facility] at [phone] with any questions or concerns with the monitor.     [address] [facility] no concerns

## 2022-02-15 ENCOUNTER — HOSPITAL ENCOUNTER (OUTPATIENT)
Dept: NEUROLOGY | Age: 52
Discharge: HOME | End: 2022-02-15
Payer: COMMERCIAL

## 2022-02-15 DIAGNOSIS — R56.9 SEIZURE (HCC): ICD-10-CM

## 2022-02-15 PROCEDURE — 95819 EEG AWAKE AND ASLEEP: CPT

## 2022-02-15 PROCEDURE — 95819 EEG AWAKE AND ASLEEP: CPT | Performed by: STUDENT IN AN ORGANIZED HEALTH CARE EDUCATION/TRAINING PROGRAM

## 2022-02-15 NOTE — PROCEDURES
ROUTINE ELECTROENCEPHALOGRAM    Identifying Information:  Name: Theresa Clark  MRN: [id number]  : 1970  Interpreting Physician: Heber Fountain DO  Referring Provider: Ladonna John DO  Date of EE/15/22  Procedure Location: Outpatient     Clinical History:  Theresa Clark is a 54 y.o. male with concerns for seizure like activity. Current Medications:   Current Outpatient Medications   Medication Instructions    FLUoxetine (PROZAC) 20 mg, Oral, DAILY, 2 tablets once daily    hydrOXYzine (VISTARIL) 25 mg, Oral, 4 TIMES DAILY AFTER MEALS AND BEFORE BEDTIME    naproxen (NAPROSYN) 500 mg, Oral, 2 TIMES DAILY    topiramate (TOPAMAX) 50 mg, Oral, 2 TIMES DAILY     Indication:  Rule out seizure/seizure disorder     Technical Summary:  28 channels of EEG were recorded in a digital format on a patient who was reported to be awake, drowsy and asleep during the recording. The patient was sleep deprived prior to the EEG. The PDR consisted of well-developed, well-regulated 9-10 Hz alpha activity, maximal over the posterior head regions and reactive to eye opening and closure. Photic stimulation was performed and did not produce any abnormalities. During the recording stage II sleep was seen. The EKG lead revealed no rhythm abnormalities. EEG Interpretation: This EEG was within normal limits for a patient of this age in the awake, drowsy and asleep states. No focal, lateralizing, or epileptiform features were seen during the recording. Clinical correlation is recommended.     Heber Fountain DO  Epileptologist  2/15/2022 5:10 PM

## 2022-03-18 PROCEDURE — 93225 XTRNL ECG REC<48 HRS REC: CPT | Performed by: INTERNAL MEDICINE

## 2022-04-04 NOTE — PROGRESS NOTES
22    Kd Cruz  1970    Chief Complaint   Patient presents with    Follow-up     Seizure. None since last visit       History of Present Illness  Michelle Beckman is a 54 y.o. male presenting today for follow-up of: Seizure and migraine. Patient has history of PTSD, bipolar and anxiety. He remains on Prozac and Vistaril. Patient does have a marijuana card. For migraine he remains on Topamax 50 mg twice daily, this dose was increased to twice daily dosing on last visit. Provided samples of Ubrelvy for abortive therapy. He tells me he did not  the Ubrelvy when he left his appointment. He reports having daily headaches. He reports taking tylenol daily multiple times a day. Michelle Beckman does have history of GRANT and was going to find a new sleep provider as he was not being followed and not using his device. Patient was following with a sleep provider but was unhappy with his care. He has had his device taken for noncompliance. He was also referred to ENT for severe tongue lacerations as well as right ear discomfort, both  were new since seizure. Referral for speech therapy for help with eating was discussed however holding off until ENT visit. He did follow up with ENT and was told he has TMJ. He also reports having hearing loss and needs hearing loss. He is following with his dentist for TMJ. MRI of brain was ordered on last visit as well as EEG for family history of malignant brain tumor, brother  at age 15. MRI brain was unremarkable. As of 2022 patient had not heard from EEG, he was given central scheduling's number to call and get set up. He did complete routine EEG which was normal.     Patient was encouraged to follow-up with PCP for psychiatric referral for movement occurring during sleep. This can occur with untreated GRANT as well as underlying psychiatric conditions.   Current Outpatient Medications   Medication Sig Dispense Refill    FLUoxetine (PROZAC) 20 MG tablet Take 20 mg by mouth daily 2 tablets once daily      hydrOXYzine (VISTARIL) 25 MG capsule Take 25 mg by mouth 4 times daily (after meals and at bedtime)      naproxen (NAPROSYN) 500 MG tablet Take 500 mg by mouth 2 times daily      topiramate (TOPAMAX) 50 MG tablet Take 1 tablet by mouth 2 times daily 60 tablet 5     No current facility-administered medications for this visit.        Physical Exam:    Constitutional              Weight: well nourished  Heart/Vascular              Rate and Rhythm: RRR              Murmurs: none              Arterial Pulses:  no carotid bruits  Neck              Appearance/Palpation/Auscultation: supple  Mental Status              Orientation: oriented to person, oriented to place, oriented to problem and oriented to time              Mood/Affect: anxious              Memory/Other: recent memory intact, remote memory intact, fund of knowledge intact, attention span normal and concentration normal  Language              Language: (normal) language, no dysarthria, (normal) articulation and no dysphasia/aphasia  Cranial Nerves              CN II Right: visual fields appear intact              CN II Left: visual fields appear intact              CN III, IV, VI: EOM no nystagmus, normal pursuit and extraocular muscle strength normal              CN III: pupil normal size, pupil reactive to light and dark, pupil accomodates and no ptosis              CN IV: normal              CN VI: normal              CN V Right: normal sensation and muscles of mastication intact              CN V Left: normal sensation and muscles of mastication intact              CN VII Right: normal facial expression              CN VII Left: normal facial expression              CN VIII Right: normal hearing to finger rub and hearing in tact to normal conversation              CN VIII Left: normal hearing to finger rub and hearing in tact to normal conversation              CN IX,X: normal palatal movement Position: Sitting, Cuff Size: Medium Adult)   Pulse 76   Ht 6' (1.829 m)   Wt 200 lb (90.7 kg)   SpO2 96%   BMI 27.12 kg/m²     Assessment and Plan     Diagnosis Orders   1. Migraine without status migrainosus, not intractable, unspecified migraine type     2. Seizure ([facility] [address]     3. GRANT (obstructive sleep apnea)  External Referral To Sleep Medicine   4. Tinnitus of right ear     5. Laceration of tongue, initial encounter     6. Bipolar 1 disorder ([facility] [address]     7. Anxiety  External Referral To Behavioral Health   8. Attention deficit hyperactivity disorder (ADHD), unspecified ADHD type     9. PTSD (post-traumatic stress disorder)       I would not make any changes to his current migraine medication regimen at this time. I am going to order a Medrol Dosepak to break current headache cycle from daily Tylenol use. I did provide him with samples of Ubrelvy in the office today, he will let me know how this works for him. In regard to sleep apnea I am going to refer him to [facility] to get a new Pap device and get him set up so that he may use his device comfortably as I do feel this is contributing to his daily headaches. I am also going to order an ambulatory EEG, his routine EEG was normal however he is reporting biting his tongue during sleep. I will be in touch with him as results warrant. I am also going to refer him to [facility] for better psychiatric care as he does have history of anxiety, ADHD, bipolar, PTSD and is struggling. He will continue to follow with his dentist for management of his TMJ. I will plan on following up with Eren Mata again in 3 months, sooner if the need arises    Medications prescribed for the patient were discussed in detail. This included a discussion of the potential risks versus potential benefits of the medications. The patient was given time to ask questions and these were answered to the best of my ability.   The patient appeared to understand the information provided. Seizure precautions discussed including no driving, tub baths, climbing ladders or operating dangerous equipment until event/seizure free for 3 months. Patient will notify in the event of any breakthrough seizure or seizure-like activity including staring spells, automatisms such as lip smacking, eye blinking, recurring episodes of déjà vu, awakening having bitten tongue during sleep. Return in about 3 months (around 7/5/2022).     Monique Dancer, APRN - NP

## 2022-04-05 ENCOUNTER — OFFICE VISIT (OUTPATIENT)
Dept: NEUROLOGY | Age: 52
End: 2022-04-05
Payer: COMMERCIAL

## 2022-04-05 VITALS
SYSTOLIC BLOOD PRESSURE: 112 MMHG | OXYGEN SATURATION: 96 % | WEIGHT: 200 LBS | HEART RATE: 76 BPM | HEIGHT: 72 IN | DIASTOLIC BLOOD PRESSURE: 70 MMHG | BODY MASS INDEX: 27.09 KG/M2

## 2022-04-05 DIAGNOSIS — S01.512A LACERATION OF TONGUE, INITIAL ENCOUNTER: ICD-10-CM

## 2022-04-05 DIAGNOSIS — F43.10 PTSD (POST-TRAUMATIC STRESS DISORDER): ICD-10-CM

## 2022-04-05 DIAGNOSIS — R56.9 SEIZURE (HCC): Primary | ICD-10-CM

## 2022-04-05 DIAGNOSIS — G43.909 MIGRAINE WITHOUT STATUS MIGRAINOSUS, NOT INTRACTABLE, UNSPECIFIED MIGRAINE TYPE: ICD-10-CM

## 2022-04-05 DIAGNOSIS — F90.9 ATTENTION DEFICIT HYPERACTIVITY DISORDER (ADHD), UNSPECIFIED ADHD TYPE: ICD-10-CM

## 2022-04-05 DIAGNOSIS — G47.33 OSA (OBSTRUCTIVE SLEEP APNEA): ICD-10-CM

## 2022-04-05 DIAGNOSIS — F41.9 ANXIETY: ICD-10-CM

## 2022-04-05 DIAGNOSIS — F31.9 BIPOLAR 1 DISORDER (HCC): ICD-10-CM

## 2022-04-05 DIAGNOSIS — H93.11 TINNITUS OF RIGHT EAR: ICD-10-CM

## 2022-04-05 PROCEDURE — G8427 DOCREV CUR MEDS BY ELIG CLIN: HCPCS | Performed by: NURSE PRACTITIONER

## 2022-04-05 PROCEDURE — 1036F TOBACCO NON-USER: CPT | Performed by: NURSE PRACTITIONER

## 2022-04-05 PROCEDURE — G8417 CALC BMI ABV UP PARAM F/U: HCPCS | Performed by: NURSE PRACTITIONER

## 2022-04-05 PROCEDURE — 99214 OFFICE O/P EST MOD 30 MIN: CPT | Performed by: NURSE PRACTITIONER

## 2022-04-05 RX ORDER — UBROGEPANT 100 MG/1
100 TABLET ORAL PRN
Qty: 2 TABLET | Refills: 0 | COMMUNITY
Start: 2022-04-05 | End: 2022-07-11 | Stop reason: SDUPTHER

## 2022-04-05 RX ORDER — METHYLPREDNISOLONE 4 MG/1
TABLET ORAL
Qty: 1 KIT | Refills: 0 | Status: SHIPPED | OUTPATIENT
Start: 2022-04-05 | End: 2022-07-11

## 2022-07-11 ENCOUNTER — OFFICE VISIT (OUTPATIENT)
Dept: NEUROLOGY | Age: 52
End: 2022-07-11
Payer: COMMERCIAL

## 2022-07-11 VITALS
BODY MASS INDEX: 30.5 KG/M2 | WEIGHT: 225.2 LBS | DIASTOLIC BLOOD PRESSURE: 64 MMHG | OXYGEN SATURATION: 96 % | HEIGHT: 72 IN | HEART RATE: 70 BPM | SYSTOLIC BLOOD PRESSURE: 106 MMHG

## 2022-07-11 DIAGNOSIS — S01.512A LACERATION OF TONGUE, INITIAL ENCOUNTER: ICD-10-CM

## 2022-07-11 DIAGNOSIS — G47.33 OSA (OBSTRUCTIVE SLEEP APNEA): ICD-10-CM

## 2022-07-11 DIAGNOSIS — F41.9 ANXIETY: ICD-10-CM

## 2022-07-11 DIAGNOSIS — F43.10 PTSD (POST-TRAUMATIC STRESS DISORDER): ICD-10-CM

## 2022-07-11 DIAGNOSIS — F31.9 BIPOLAR 1 DISORDER (HCC): ICD-10-CM

## 2022-07-11 DIAGNOSIS — G43.909 MIGRAINE WITHOUT STATUS MIGRAINOSUS, NOT INTRACTABLE, UNSPECIFIED MIGRAINE TYPE: ICD-10-CM

## 2022-07-11 DIAGNOSIS — F90.9 ATTENTION DEFICIT HYPERACTIVITY DISORDER (ADHD), UNSPECIFIED ADHD TYPE: ICD-10-CM

## 2022-07-11 DIAGNOSIS — R56.9 SEIZURE (HCC): Primary | ICD-10-CM

## 2022-07-11 PROCEDURE — G8417 CALC BMI ABV UP PARAM F/U: HCPCS | Performed by: NURSE PRACTITIONER

## 2022-07-11 PROCEDURE — G8427 DOCREV CUR MEDS BY ELIG CLIN: HCPCS | Performed by: NURSE PRACTITIONER

## 2022-07-11 PROCEDURE — 1036F TOBACCO NON-USER: CPT | Performed by: NURSE PRACTITIONER

## 2022-07-11 PROCEDURE — 99214 OFFICE O/P EST MOD 30 MIN: CPT | Performed by: NURSE PRACTITIONER

## 2022-07-11 RX ORDER — LAMOTRIGINE 25 MG/1
TABLET ORAL
COMMUNITY
Start: 2022-05-02

## 2022-07-11 RX ORDER — UBROGEPANT 100 MG/1
100 TABLET ORAL PRN
Qty: 16 TABLET | Refills: 3 | Status: SHIPPED | OUTPATIENT
Start: 2022-07-11 | End: 2022-08-10

## 2022-07-11 NOTE — PROGRESS NOTES
7/11/22    Gann Petkika  1970    Chief Complaint   Patient presents with    Seizures     pt presents for f/u of seizure, pt states he did the EEG and wants results, pt states no seizure activity since last visit    Migraine     pt presents for migraine f/u, pt states migraine are about the same no improvement, pt states topamax it helped some, but the Ubrelvy did great and wants a rx       History of Present Illness  Jorge Luis Crouch is a 54 y.o. male presenting today for follow-up of: Seizure and migraine. For migraine patient remains on Topamax 50 mg twice daily. Jorge Luis Crouch has been provided samples of Ubrelvy 50 mg for abortive therapy, he was to let me know how this worked for him. He reports having 9-10/30 days. He does like Nicholas Carrillo, this works well to help decrease his migraines. On last visit Jorge Luis Crouch reported taking Tylenol daily multiple times a day for daily headaches. He does have history of GRANT and was referred to [address] for management. He has not followed up with sleep center. He was counseled on decreasing over-the-counter pain remedies to 2-3 times weekly to avoid rebound headaches. On last visit Jorge Luis Crouch was also referred to [facility] for management of his PTSD, bipolar and anxiety. He did follow with them and was started on lamictal but has not followed up again as he reports having difficulty reaching them to schedule his follow-up. In regard to seizure, ambulatory EEG was ordered due to reports of Jorge Luis Crouch biting his tongue in his sleep. Routine EEG was normal.  Jorge Luis Crouch does have history of TMJ. He is not on any management for TMJ as he is having difficulty with insurance. He reports continued episodes where he will tense up full body at night, he is unsure how long this lasts, he is not asleep, he is aware the entire time, no loss of bowel of bowel, no tongue biting. This occurs as he is falling asleep.     Current Outpatient Medications   Medication Sig Dispense Refill  methylPREDNISolone (MEDROL DOSEPACK) 4 MG tablet Take by mouth. 1 kit 0    Ubrogepant (UBRELVY) 100 MG TABS Take 100 mg by mouth as needed (Migraines) 2 tablet 0    FLUoxetine (PROZAC) 20 MG tablet Take 20 mg by mouth daily 2 tablets once daily (Patient not taking: Reported on 4/5/2022)      hydrOXYzine (VISTARIL) 25 MG capsule Take 25 mg by mouth 4 times daily (after meals and at bedtime)      naproxen (NAPROSYN) 500 MG tablet Take 500 mg by mouth 2 times daily      topiramate (TOPAMAX) 50 MG tablet Take 1 tablet by mouth 2 times daily 60 tablet 5     No current facility-administered medications for this visit.        Physical Exam:  Constitutional              Weight: well nourished  Heart/Vascular              Rate and Rhythm: RRR              Murmurs: none              Arterial Pulses:  no carotid bruits  Neck              Appearance/Palpation/Auscultation: supple  Mental Status              Orientation: oriented to person, oriented to place, oriented to problem and oriented to time              Mood/Affect: anxious              Memory/Other: recent memory intact, remote memory intact, fund of knowledge intact, attention span normal and concentration normal  Language              Language: (normal) language, no dysarthria, (normal) articulation and no dysphasia/aphasia  Cranial Nerves              CN II Right: visual fields appear intact              CN II Left: visual fields appear intact              CN III, IV, VI: EOM no nystagmus, normal pursuit and extraocular muscle strength normal              CN III: pupil normal size, pupil reactive to light and dark, pupil accomodates and no ptosis              CN IV: normal              CN VI: normal              CN V Right: normal sensation and muscles of mastication intact              CN V Left: normal sensation and muscles of mastication intact              CN VII Right: normal facial expression              CN VII Left: normal facial expression              MORALES VIII Right: normal hearing to finger rub and hearing in tact to normal conversation              NA VIII Left: normal hearing to finger rub and hearing in tact to normal conversation              UB IX,X: normal palatal movement              CN XI Right: normal sternocleidomastoid and normal trapezius              JK XI Left: normal sternocleidomastoid and normal trapezius              JL XII: no tremors of the tongue, no fasciculation of the tongue, tongue protrudes midline, normal power to left and normal power to right, he has bilateral lateral tongue lacerations  Gait and Stance              Gait/Posture: station normal, ambulates independently, gait normal, Romberg's test normal and tandem gait abnormal  Motor/Coordination Exam              General: no bradykinesia, no tremors, no chorea, no athetosis, no myoclonus and no dyskinesia              Right Upper Extremity: normal motor strength, normal bulk and normal tone              Left Upper Extremity: normal motor strength, normal bulk and normal tone              Right Lower Extremity: normal motor strength, normal bulk and normal tone              Left Lower Extremity: normal motor strength, normal bulk and normal tone              Coordination: no drift, normal finger-to-nose and rapid alternating movements normal  Reflexes              Reflexes Right: DTRS are normal throughout              Reflexes Left: DTRS are normal throughout              Plantar Reflex Right: response downgoing              Plantar Reflex Left: response downgoing              Hoffmans Reflex Right: present              Hoffmans Reflex Left: present  Sensory              Sensation: normal light touch, normal temperature, normal vibration, no neglect and decreased pinprick RUE diffuse      /64 (Site: Left Upper Arm, Position: Sitting, Cuff Size: Large Adult)   Pulse 70   Ht 6' (1.829 m)   Wt 225 lb 3.2 oz (102.2 kg)   SpO2 96%   BMI 30.54 kg/m² Assessment and Plan     Diagnosis Orders   1. Seizure ([facility] [address]     2. Migraine without status migrainosus, not intractable, unspecified migraine type     3. GRANT (obstructive sleep apnea)     4. Attention deficit hyperactivity disorder (ADHD), unspecified ADHD type     5. PTSD (post-traumatic stress disorder)     6. Anxiety     7. Bipolar 1 disorder ([facility] [address]     8. Laceration of tongue, initial encounter       Have July Beebe contact the [facility] sleep lab to manage his GRANT as well as repeat PSG to determine if these nighttime episodes July Beebe is experiencing are sleep related. EEG was negative both routine and ambulatory. In regard to migraines, he is happy with current medication management. I will send a prescription over for 100 mg Ubrelvy for abortive therapy as he is happy with [facility]. I encouraged him to continue to follow with [facility] for management of his PTSD, anxiety, depression. In regard to TMJ symptoms, I encouraged him to contact care source to find a list of providers that accept this insurance. I will plan on following up with July Beebe again in 3 months, sooner if the need arises. Medications prescribed for the patient were discussed in detail. This included a discussion of the potential risks versus potential benefits of the medications. The patient was given time to ask questions and these were answered to the best of my ability. The patient appeared to understand the information provided. Seizure precautions discussed including no driving, tub baths, climbing ladders or operating dangerous equipment until event/seizure free for 3 months. Patient will notify in the event of any breakthrough seizure or seizure-like activity including staring spells, automatisms such as lip smacking, eye blinking, recurring episodes of déjà vu, awakening having bitten tongue during sleep. Return in about 3 months (around 10/11/2022).     HAYLEY Alford NP

## 2022-07-13 DIAGNOSIS — G43.909 MIGRAINE WITHOUT STATUS MIGRAINOSUS, NOT INTRACTABLE, UNSPECIFIED MIGRAINE TYPE: Primary | ICD-10-CM

## 2022-07-14 NOTE — TELEPHONE ENCOUNTER
Called and explained this to him please and see if he is interested in having sumatriptan ordered for him to trial and if this does not work after 14 days then we can give him Nurtec and if this does not work after 14 days we can then get Mirta Leon approved?   If he would like the sumatriptan called and can we find out what pharmacy he would prefer please

## 2022-08-08 NOTE — TELEPHONE ENCOUNTER
Lm for patient to call office to let us know if he will move forward with one of the preferred medication.

## 2022-08-29 NOTE — TELEPHONE ENCOUNTER
Pt stopped into office asking about the medication he needed to try a 14 day trial of being sent to his pharmacy. Let him know we will get that sent in and to let us know how it works in about 2-3 weeks. He states verbal understanding. Obtained an appeal consent in case we need to appeal any medication in the future.

## 2022-08-30 RX ORDER — RIMEGEPANT SULFATE 75 MG/75MG
75 TABLET, ORALLY DISINTEGRATING ORAL PRN
Qty: 8 TABLET | Refills: 0 | Status: SHIPPED | OUTPATIENT
Start: 2022-08-30

## 2022-10-12 ENCOUNTER — OFFICE VISIT (OUTPATIENT)
Dept: NEUROLOGY | Age: 52
End: 2022-10-12
Payer: COMMERCIAL

## 2022-10-12 VITALS
HEART RATE: 87 BPM | HEIGHT: 72 IN | WEIGHT: 225.2 LBS | DIASTOLIC BLOOD PRESSURE: 78 MMHG | OXYGEN SATURATION: 97 % | BODY MASS INDEX: 30.5 KG/M2 | SYSTOLIC BLOOD PRESSURE: 128 MMHG

## 2022-10-12 DIAGNOSIS — R56.9 SEIZURE (HCC): ICD-10-CM

## 2022-10-12 DIAGNOSIS — F43.10 PTSD (POST-TRAUMATIC STRESS DISORDER): ICD-10-CM

## 2022-10-12 DIAGNOSIS — G47.33 OSA (OBSTRUCTIVE SLEEP APNEA): ICD-10-CM

## 2022-10-12 DIAGNOSIS — F41.9 ANXIETY: ICD-10-CM

## 2022-10-12 DIAGNOSIS — F31.9 BIPOLAR 1 DISORDER (HCC): ICD-10-CM

## 2022-10-12 DIAGNOSIS — G43.909 MIGRAINE WITHOUT STATUS MIGRAINOSUS, NOT INTRACTABLE, UNSPECIFIED MIGRAINE TYPE: Primary | ICD-10-CM

## 2022-10-12 DIAGNOSIS — F90.9 ATTENTION DEFICIT HYPERACTIVITY DISORDER (ADHD), UNSPECIFIED ADHD TYPE: ICD-10-CM

## 2022-10-12 PROCEDURE — G8417 CALC BMI ABV UP PARAM F/U: HCPCS | Performed by: NURSE PRACTITIONER

## 2022-10-12 PROCEDURE — G8484 FLU IMMUNIZE NO ADMIN: HCPCS | Performed by: NURSE PRACTITIONER

## 2022-10-12 PROCEDURE — 1036F TOBACCO NON-USER: CPT | Performed by: NURSE PRACTITIONER

## 2022-10-12 PROCEDURE — G8427 DOCREV CUR MEDS BY ELIG CLIN: HCPCS | Performed by: NURSE PRACTITIONER

## 2022-10-12 PROCEDURE — 99214 OFFICE O/P EST MOD 30 MIN: CPT | Performed by: NURSE PRACTITIONER

## 2022-10-12 RX ORDER — UBROGEPANT 100 MG/1
TABLET ORAL
COMMUNITY
Start: 2022-10-10

## 2022-10-12 RX ORDER — PREDNISONE 10 MG/1
TABLET ORAL
COMMUNITY
Start: 2022-10-10

## 2022-10-12 RX ORDER — CHOLECALCIFEROL (VITAMIN D3) 1250 MCG
CAPSULE ORAL DAILY
COMMUNITY

## 2022-10-12 RX ORDER — FLUTICASONE FUROATE, UMECLIDINIUM BROMIDE AND VILANTEROL TRIFENATATE 100; 62.5; 25 UG/1; UG/1; UG/1
1 POWDER RESPIRATORY (INHALATION) DAILY
COMMUNITY

## 2022-10-12 RX ORDER — ALBUTEROL SULFATE 90 UG/1
2 AEROSOL, METERED RESPIRATORY (INHALATION) PRN
COMMUNITY

## 2022-10-12 RX ORDER — QUETIAPINE 300 MG/1
TABLET, FILM COATED, EXTENDED RELEASE ORAL
COMMUNITY
Start: 2022-09-20

## 2022-10-12 RX ORDER — GUAIFENESIN 600 MG/1
1200 TABLET, EXTENDED RELEASE ORAL 2 TIMES DAILY
COMMUNITY

## 2022-10-12 RX ORDER — FLUTICASONE FUROATE AND VILANTEROL 200; 25 UG/1; UG/1
POWDER RESPIRATORY (INHALATION)
COMMUNITY
Start: 2022-10-01

## 2022-10-12 NOTE — PROGRESS NOTES
10/12/22    Bean Kirby  1970    Chief Complaint   Patient presents with    Seizures     Pt presents for seizures and migraine f/u, pt states he was just diagnosed with emphysema and just started a new med. Pt states his headaches have been terrible and lasting days and hopes this diagnosis hepls       History of Present Illness  Milan Bates is a 54 y.o. male presenting today for follow-up of: Seizure migraine. He remains on Topamax 50 mg twice daily and was given samples of Ubrelvy and Nurtec for abortive therapy. At his last visit he was having 9 out of 30 headache days. Milan Bates was using Tylenol more than 2 times weekly his last visit. He does have a history of GRANT and was referred for sleep study. He was referred to [facility] wellness for management of his PTSD, bipolar anxiety. He is now taking Seroquel 300 mg at bedtime. At his last visit, ambulatory EEG was ordered as Milan Bates was having tongue biting in his sleep. It did not show seizure activity. On today's visit, Milan Bates tells me he is taking Topamax 50 mg twice daily for migraine prevention and Ubrelvy for abortive therapy. He tells me his migraines have gotten worse. He is having 17 out of 30 migraine days. He describes his migraines as right frontal, throbbing, associated with light and sound sensitivity, nausea and he vomits 4 out of 17 times. They always last longer than 4 hours. He tells me he is not getting good sleep at night. His anxiety keeps him awake. He follows with [facility], [facility] pulmonology. He was diagnosed with COPD. He is taking prednisone 10 mg for 5 days. He tells me he will have a sleep study soon.         Current Outpatient Medications   Medication Sig Dispense Refill    predniSONE (DELTASONE) 10 MG tablet TAKE 4 TABLETS BY MOUTH ONCE DAILY      QUEtiapine (SEROQUEL XR) 300 MG extended release tablet TAKE 1 TABLET BY MOUTH ONCE DAILY IN THE EVENING AFTER COMPLETING DOSE PACK      UBRELVY 100 MG TABS fluticasone-umeclidin-vilant (TRELEGY ELLIPTA) 100-62.5-25 MCG/INH AEPB Inhale 1 puff into the lungs daily      albuterol sulfate HFA (VENTOLIN HFA) 108 (90 Base) MCG/ACT inhaler Inhale 2 puffs into the lungs as needed for Wheezing      Cholecalciferol (VITAMIN D3) 1.25 MG (24180 UT) CAPS Take by mouth daily      guaiFENesin (MUCINEX) 600 MG extended release tablet Take 1,200 mg by mouth 2 times daily      topiramate (TOPAMAX) 50 MG tablet Take 1 tablet by mouth 2 times daily 60 tablet 5    Fluticasone furoate-vilanterol (BREO ELLIPTA) 200-25 MCG/INH AEPB inhaler INHALE 1 PUFF BY MOUTH ONCE DAILY      Rimegepant Sulfate (NURTEC) 75 MG TBDP Take 75 mg by mouth as needed (at on set of migraine. Do not take more than 1 in 24 hours.) (Patient not taking: Reported on 10/12/2022) 8 tablet 0    lamoTRIgine (LAMICTAL) 25 MG tablet TAKE 1 TABLET BY MOUTH ONCE DAILY FOR 14 DAYS AND THEN 2 ONCE DAILY AS DIRECTED THEREAFTER (Patient not taking: Reported on 10/12/2022)      hydrOXYzine (VISTARIL) 25 MG capsule Take 25 mg by mouth 4 times daily (after meals and at bedtime) (Patient not taking: Reported on 10/12/2022)      naproxen (NAPROSYN) 500 MG tablet Take 500 mg by mouth 2 times daily (Patient not taking: Reported on 10/12/2022)       No current facility-administered medications for this visit.        Physical Exam:  Constitutional              Weight: well nourished  Mental Status              Orientation: oriented to person, oriented to place, oriented to problem and oriented to time              Mood/Affect: anxious              Memory/Other: recent memory intact, remote memory intact, fund of knowledge intact, attention span normal and concentration normal  Language              Language: (normal) language, no dysarthria, (normal) articulation and no dysphasia/aphasia  Cranial Nerves              CN II Right: visual fields appear intact              CN II Left: visual fields appear intact              CN III, IV, VI: EOM no nystagmus, normal pursuit and extraocular muscle strength normal              CN III: pupil normal size, pupil reactive to light and dark, pupil accomodates and no ptosis              CN IV: normal              CN VI: normal              CN V Right: normal sensation and muscles of mastication intact              CN V Left: normal sensation and muscles of mastication intact              CN VII Right: normal facial expression              CN VII Left: normal facial expression              CN VIII Right: normal hearing to finger rub and hearing in tact to normal conversation              CN VIII Left: normal hearing to finger rub and hearing in tact to normal conversation              CN IX,X: normal palatal movement              CN XI Right: normal sternocleidomastoid and normal trapezius              CN XI Left: normal sternocleidomastoid and normal trapezius              CN XII: no tremors of the tongue, no fasciculation of the tongue, tongue protrudes midline, normal power to left and normal power to right, he has bilateral lateral tongue lacerations  Gait and Stance              Gait/Posture: station normal, ambulates independently, gait normal, Romberg's test normal and tandem gait abnormal  Motor/Coordination Exam              General: no bradykinesia, no tremors, no chorea, no athetosis, no myoclonus and no dyskinesia              Right Upper Extremity: normal motor strength, normal bulk and normal tone              Left Upper Extremity: normal motor strength, normal bulk and normal tone              Right Lower Extremity: normal motor strength, normal bulk and normal tone              Left Lower Extremity: normal motor strength, normal bulk and normal tone              Coordination: no drift, normal finger-to-nose and rapid alternating movements normal  Reflexes              Reflexes Right: DTRS are normal throughout              Reflexes Left: DTRS are normal throughout              Plantar Reflex Right: response downgoing              Plantar Reflex Left: response downgoing              Hoffmans Reflex Right: present              Hoffmans Reflex Left: present  Sensory              Sensation: normal light touch, normal temperature, normal vibration, no neglect and decreased pinprick RUE diffuse      /78 (Site: Left Upper Arm, Position: Sitting, Cuff Size: Large Adult)   Pulse 87   Ht 6' (1.829 m)   Wt 225 lb 3.2 oz (102.2 kg)   SpO2 97%   BMI 30.54 kg/m²     Assessment and Plan     Diagnosis Orders   1. Migraine without status migrainosus, not intractable, unspecified migraine type        2. Seizure ([facility] [address]        3. GRANT (obstructive sleep apnea)        4. Attention deficit hyperactivity disorder (ADHD), unspecified ADHD type        5. PTSD (post-traumatic stress disorder)        6. Bipolar 1 disorder ([facility]        7. Anxiety          Omid Grajeda was seen in neurological follow up in regards to migraine, seizure-like activity on last visit. I am happy to hear that Omid Grajeda has not had any further seizure-like activity. His routine EEG was normal.    In regards to his migraines, unfortunately he is having increased frequency and intensity. He is taking Topamax 50 mg twice daily and Ubrelvy 100 mg. After considering his increased migraines, poor sleep, anxiety I would like to initiate Elavil titration up to 20 mg at bedtime for additional migraine prevention therapy. He had a recent MRI of the brain on 1/6/2022 that was unremarkable. I am happy to hear that Omid Grajeda will be having a sleep study soon as sleep apnea can make it hard to manage migraines. If he finds that he has sleep apnea and needs a PAP device, we can titrate him off of migraine medications in the future if his migraines improve. At the end of the visit, Omid Grajeda mentioned he has some right lower extremity pain that he describes as \"burning like somebody is putting a lemon in a cut. \"  Elavil can treat neuropathic pain as well, I will address this at his next visit with a more detailed history and exam and possibly an EMG. Medications prescribed for the patient were discussed in detail. This included a discussion of the potential risks vs the potential benefits of the medications. The patient was given time to ask questions and these were answered to the best of my ability. The patient appeared to understand the information provided. Return in about 3 months (around 1/12/2023).     Claudetta Chafe, HAYLEY - CNP

## 2022-11-14 ENCOUNTER — TELEPHONE (OUTPATIENT)
Dept: NEUROLOGY | Age: 52
End: 2022-11-14

## 2022-11-14 RX ORDER — AMITRIPTYLINE HYDROCHLORIDE 10 MG/1
TABLET, FILM COATED ORAL
Qty: 60 TABLET | Refills: 5 | Status: SHIPPED | OUTPATIENT
Start: 2022-11-14

## 2022-11-14 RX ORDER — AMITRIPTYLINE HYDROCHLORIDE 10 MG/1
20 TABLET, FILM COATED ORAL NIGHTLY
Qty: 60 TABLET | Refills: 5 | Status: SHIPPED | OUTPATIENT
Start: 2022-11-14

## 2022-11-14 NOTE — TELEPHONE ENCOUNTER
Patient called stating the medication that Robyn Philip was suppose to start for him was not sent. Per office visit note patient was suppose to start Elavil to help with migraines due to Anxiety and sleep issues. Per chart nothing was sent in, please send in to pharmacy on file.

## 2023-05-05 DIAGNOSIS — G43.909 MIGRAINE WITHOUT STATUS MIGRAINOSUS, NOT INTRACTABLE, UNSPECIFIED MIGRAINE TYPE: ICD-10-CM

## 2023-05-05 RX ORDER — RIMEGEPANT SULFATE 75 MG/75MG
TABLET, ORALLY DISINTEGRATING ORAL
Qty: 8 TABLET | Refills: 0 | OUTPATIENT
Start: 2023-05-05

## 2024-12-05 ENCOUNTER — OFFICE VISIT (OUTPATIENT)
Dept: NEUROLOGY | Age: 54
End: 2024-12-05
Payer: COMMERCIAL

## 2024-12-05 VITALS
RESPIRATION RATE: 18 BRPM | HEIGHT: 72 IN | WEIGHT: 241.6 LBS | SYSTOLIC BLOOD PRESSURE: 129 MMHG | DIASTOLIC BLOOD PRESSURE: 83 MMHG | TEMPERATURE: 97.2 F | BODY MASS INDEX: 32.72 KG/M2 | HEART RATE: 83 BPM

## 2024-12-05 DIAGNOSIS — G43.119 INTRACTABLE MIGRAINE WITH AURA WITHOUT STATUS MIGRAINOSUS: Primary | ICD-10-CM

## 2024-12-05 PROCEDURE — 1036F TOBACCO NON-USER: CPT | Performed by: NEUROMUSCULOSKELETAL MEDICINE, SPORTS MEDICINE

## 2024-12-05 PROCEDURE — G8417 CALC BMI ABV UP PARAM F/U: HCPCS | Performed by: NEUROMUSCULOSKELETAL MEDICINE, SPORTS MEDICINE

## 2024-12-05 PROCEDURE — G8484 FLU IMMUNIZE NO ADMIN: HCPCS | Performed by: NEUROMUSCULOSKELETAL MEDICINE, SPORTS MEDICINE

## 2024-12-05 PROCEDURE — G8427 DOCREV CUR MEDS BY ELIG CLIN: HCPCS | Performed by: NEUROMUSCULOSKELETAL MEDICINE, SPORTS MEDICINE

## 2024-12-05 PROCEDURE — 3017F COLORECTAL CA SCREEN DOC REV: CPT | Performed by: NEUROMUSCULOSKELETAL MEDICINE, SPORTS MEDICINE

## 2024-12-05 PROCEDURE — 99203 OFFICE O/P NEW LOW 30 MIN: CPT | Performed by: NEUROMUSCULOSKELETAL MEDICINE, SPORTS MEDICINE

## 2024-12-05 RX ORDER — FLUTICASONE FUROATE, UMECLIDINIUM BROMIDE AND VILANTEROL TRIFENATATE 200; 62.5; 25 UG/1; UG/1; UG/1
1 POWDER RESPIRATORY (INHALATION) DAILY
COMMUNITY

## 2024-12-05 RX ORDER — VILOXAZINE HYDROCHLORIDE 200 MG/1
CAPSULE, EXTENDED RELEASE ORAL DAILY
COMMUNITY

## 2024-12-05 RX ORDER — ZOLPIDEM TARTRATE 5 MG/1
5 TABLET ORAL NIGHTLY PRN
COMMUNITY
Start: 2024-11-19

## 2024-12-05 RX ORDER — GUANFACINE 4 MG/1
TABLET, EXTENDED RELEASE ORAL
COMMUNITY
Start: 2024-11-19

## 2024-12-05 RX ORDER — CARIPRAZINE 4.5 MG/1
CAPSULE, GELATIN COATED ORAL
COMMUNITY
Start: 2024-11-19

## 2024-12-05 RX ORDER — PROPRANOLOL HYDROCHLORIDE 60 MG/1
60 CAPSULE, EXTENDED RELEASE ORAL DAILY
Qty: 30 CAPSULE | Refills: 1 | Status: SHIPPED | OUTPATIENT
Start: 2024-12-05 | End: 2025-01-04

## 2024-12-05 RX ORDER — BUSPIRONE HYDROCHLORIDE 10 MG/1
10 TABLET ORAL 3 TIMES DAILY
COMMUNITY
Start: 2024-11-19

## 2024-12-05 RX ORDER — CLONIDINE HYDROCHLORIDE 0.1 MG/1
0.1 TABLET ORAL 2 TIMES DAILY
COMMUNITY
Start: 2024-09-27

## 2024-12-05 NOTE — PROGRESS NOTES
needed for Wheezing       No current facility-administered medications for this visit.      DATA:  Lab Results   Component Value Date    WBC 14.4 (H) 12/29/2021    HGB 15.9 12/29/2021     12/29/2021    ALT 24 12/29/2021    AST 19 12/29/2021     12/29/2021    K 3.8 12/29/2021     12/29/2021    CREATININE 0.9 12/29/2021    BUN 18 12/29/2021    CO2 24 12/29/2021       /83 (Site: Left Upper Arm, Position: Sitting, Cuff Size: Medium Adult)   Pulse 83   Temp 97.2 °F (36.2 °C) (Temporal)   Resp 18   Ht 1.829 m (6')   Wt 109.6 kg (241 lb 9.6 oz)   BMI 32.77 kg/m²     NEUROLOGICAL EXAMINATION:     MENTAL STATUS: Normal    CRANIAL NERVES: Pupils are equal and reactive.  EOMS are complete  No abnormal eye movements.  No visual field defect.  Facial sensation is normal.  No facial weakness.  Hearing is normal.  Palate and tongue movements are normal. Shoulder shrug is symmetrical    MOTOR EXAMINATION: Muscle tone is normal .Strength is 5/5 in both upper and lower limbs.  No abnormal limb movements.     SENSORY EXAMINATION: Normal.     STRETCH REFLEXES: Symmetrical in both the upper and lower limbs.      GAIT: Normal      IMPRESSION: Chronic migraine headaches with aura.    PLAN:    1.  Started on propranolol LA 60 mg/day.  2.  Nurtec 75 mg as needed.  3.  Follow-up in 1 month with a headache journal..        NOTE: This neurology evaluation is part of outpatient coverage at [facility]  1-2 days per week.  Patients requiring frequent evaluations or uncomfortable with potential 3-4 day turnaround on questions or calls  may be better served by a neurologist in the area full time.  [facility] is available for outpatient visits and procedures including EMG/NCS.  Non-[facility] neurologists also practice in [place] (Dr. Noland) and [place] (Dr's Danner, Benedikt).       Foreign Estrada MD   12/5/2024  6:17 PM

## 2024-12-05 NOTE — PATIENT INSTRUCTIONS
SURVEY:    Thank you for allowing us to care for you today.    You may be receiving a survey from [facility] regarding your visit today- electronically or via mail.      Please help us by completing the survey as this will provide the needed feedback to ensure we are providing the very best care for you and your family.    If you cannot score us a very good on any question, please call the office to discuss how we could have made your experience a very good one.    Thank you.       STAFF:    Anupama WILLIAMSON, Carmen SINCLAIR, Jeimy JONES CMA      CLINICAL STAFF:    Leila LUIS LPN, Gracie VELASCO LPN, Carmen BARCLAY LPN, Laura WILLIAMSON

## 2024-12-13 ENCOUNTER — TELEPHONE (OUTPATIENT)
Dept: NEUROLOGY | Age: 54
End: 2024-12-13

## 2024-12-13 NOTE — TELEPHONE ENCOUNTER
Called and notified patient of [facility] approval. Ins approved 8 tabs per 30 days. He verbalized understanding.

## 2025-01-23 ENCOUNTER — OFFICE VISIT (OUTPATIENT)
Dept: NEUROLOGY | Age: 55
End: 2025-01-23
Payer: COMMERCIAL

## 2025-01-23 VITALS
WEIGHT: 246.7 LBS | HEIGHT: 72 IN | HEART RATE: 77 BPM | SYSTOLIC BLOOD PRESSURE: 137 MMHG | BODY MASS INDEX: 33.41 KG/M2 | DIASTOLIC BLOOD PRESSURE: 84 MMHG | TEMPERATURE: 97.3 F | RESPIRATION RATE: 20 BRPM

## 2025-01-23 DIAGNOSIS — G43.119 INTRACTABLE MIGRAINE WITH AURA WITHOUT STATUS MIGRAINOSUS: ICD-10-CM

## 2025-01-23 PROCEDURE — 1036F TOBACCO NON-USER: CPT | Performed by: NEUROMUSCULOSKELETAL MEDICINE, SPORTS MEDICINE

## 2025-01-23 PROCEDURE — 99213 OFFICE O/P EST LOW 20 MIN: CPT | Performed by: NEUROMUSCULOSKELETAL MEDICINE, SPORTS MEDICINE

## 2025-01-23 PROCEDURE — 3017F COLORECTAL CA SCREEN DOC REV: CPT | Performed by: NEUROMUSCULOSKELETAL MEDICINE, SPORTS MEDICINE

## 2025-01-23 PROCEDURE — G8417 CALC BMI ABV UP PARAM F/U: HCPCS | Performed by: NEUROMUSCULOSKELETAL MEDICINE, SPORTS MEDICINE

## 2025-01-23 PROCEDURE — G8427 DOCREV CUR MEDS BY ELIG CLIN: HCPCS | Performed by: NEUROMUSCULOSKELETAL MEDICINE, SPORTS MEDICINE

## 2025-01-23 RX ORDER — OMEPRAZOLE 40 MG/1
40 CAPSULE, DELAYED RELEASE ORAL
COMMUNITY
Start: 2024-11-15

## 2025-01-23 RX ORDER — MIRTAZAPINE 30 MG/1
30 TABLET, FILM COATED ORAL NIGHTLY
COMMUNITY
Start: 2025-01-06

## 2025-01-23 RX ORDER — DESVENLAFAXINE 25 MG/1
25 TABLET, EXTENDED RELEASE ORAL DAILY
COMMUNITY
Start: 2025-01-06

## 2025-01-23 RX ORDER — CLONIDINE HYDROCHLORIDE 0.2 MG/1
0.2 TABLET ORAL 3 TIMES DAILY PRN
COMMUNITY

## 2025-01-23 RX ORDER — PROPRANOLOL HYDROCHLORIDE 80 MG/1
80 CAPSULE, EXTENDED RELEASE ORAL DAILY
Qty: 30 CAPSULE | Refills: 2 | Status: SHIPPED | OUTPATIENT
Start: 2025-01-23 | End: 2025-02-22

## 2025-01-23 RX ORDER — PREDNISONE 20 MG/1
TABLET ORAL DAILY
COMMUNITY
Start: 2025-01-10 | End: 2025-01-24

## 2025-01-23 NOTE — PROGRESS NOTES
NEUROLOGY follow-up.      Patient Name:  Jese Massey  :   1970  Clinic Visit Date: 2025    I saw Mr. Jese Massey  in the neurology clinic today for follow up of migraine headaches.  54-year-old right-handed gentleman with a known history of PTSD, depression, anxiety, bipolar disorder, sleep apnea and chronic headaches.  \"Doing better \" on propranolol LA. 60 mg/day.  Headaches have been less severe and less frequent.  No side effects.  He had a severe intractable headache last night, with a sensation of numbness right side of the face and injection of the right eye last night which did not improve with Nurtec and  he continues to have a mild dull aching headache this morning.  Overall, he believes that propranolol has been helping.  He also takes Nurtec as needed with some success.  No new neurological complaints at this time.    REVIEW OF SYSTEMS    Constitutional Weight changes: absent, change in appetite: absent Fatigue: absent;Fevers : absent, Any recent hospitalizations:  absent   HEENT Ears: normal,  Visual disturbance: absent   Respiratory Shortness of breath: absent, choking:  absent, Cough: absent, Snoring : absent   Cardiovascular Chest pain: absent, Leg swelling :absent, palpitations : absent, fainting : absent   GI Constipation: absent, Diarrhea: absent, Swallowing change: absent    Urinary frequency: absent, Urinary urgency: absent, Urinary incontinence: absent   Musculoskeletal Neck pain: present, Back pain: absent, Stiffness: absent, Muscle pain: absent, Joint pain: absent, restless leg : absent   Dermatological Hair loss: absent, Skin changes: absent   Neurological Confusion: absent, Trouble concentrating: absent, Seizures: absent;  Memory loss: absent, balance problem: absent, Dizziness: absent, vertigo: absent, Weakness: absent, Numbness absent, Tremor: absent, Spasm: absent, involuntary movement: absent, Speech difficulty: absent, Headache: present, Light sensitivity: absent

## 2025-01-23 NOTE — PATIENT INSTRUCTIONS
SURVEY:    Thank you for allowing us to care for you today.    You may be receiving a survey from [facility] regarding your visit today- electronically or via mail.      Please help us by completing the survey as this will provide the needed feedback to ensure we are providing the very best care for you and your family.    If you cannot score us a very good on any question, please call the office to discuss how we could have made your experience a very good one.    Thank you.       STAFF:    Patricia Morales, Jeimy AVILES      CLINICAL STAFF:    Leila ENGLISH, Anupama AVILES, Laura AVILES, Gracie ENGLISH

## 2025-02-03 RX ORDER — PROPRANOLOL HYDROCHLORIDE 60 MG/1
60 CAPSULE, EXTENDED RELEASE ORAL DAILY
Qty: 30 CAPSULE | Refills: 1 | OUTPATIENT
Start: 2025-02-03 | End: 2025-03-05

## 2025-04-21 RX ORDER — PROPRANOLOL HYDROCHLORIDE 80 MG/1
80 CAPSULE, EXTENDED RELEASE ORAL DAILY
Qty: 30 CAPSULE | Refills: 2 | Status: SHIPPED | OUTPATIENT
Start: 2025-04-21 | End: 2025-05-21

## 2025-04-24 ENCOUNTER — OFFICE VISIT (OUTPATIENT)
Dept: NEUROLOGY | Age: 55
End: 2025-04-24
Payer: COMMERCIAL

## 2025-04-24 VITALS
HEIGHT: 72 IN | RESPIRATION RATE: 16 BRPM | DIASTOLIC BLOOD PRESSURE: 75 MMHG | HEART RATE: 71 BPM | BODY MASS INDEX: 34.23 KG/M2 | TEMPERATURE: 97.3 F | SYSTOLIC BLOOD PRESSURE: 112 MMHG | WEIGHT: 252.7 LBS

## 2025-04-24 DIAGNOSIS — G43.909 MIGRAINE WITHOUT STATUS MIGRAINOSUS, NOT INTRACTABLE, UNSPECIFIED MIGRAINE TYPE: Primary | ICD-10-CM

## 2025-04-24 PROCEDURE — 3017F COLORECTAL CA SCREEN DOC REV: CPT | Performed by: NEUROMUSCULOSKELETAL MEDICINE, SPORTS MEDICINE

## 2025-04-24 PROCEDURE — G8427 DOCREV CUR MEDS BY ELIG CLIN: HCPCS | Performed by: NEUROMUSCULOSKELETAL MEDICINE, SPORTS MEDICINE

## 2025-04-24 PROCEDURE — 99213 OFFICE O/P EST LOW 20 MIN: CPT | Performed by: NEUROMUSCULOSKELETAL MEDICINE, SPORTS MEDICINE

## 2025-04-24 PROCEDURE — 1036F TOBACCO NON-USER: CPT | Performed by: NEUROMUSCULOSKELETAL MEDICINE, SPORTS MEDICINE

## 2025-04-24 PROCEDURE — G8417 CALC BMI ABV UP PARAM F/U: HCPCS | Performed by: NEUROMUSCULOSKELETAL MEDICINE, SPORTS MEDICINE

## 2025-04-24 RX ORDER — HYDROXYZINE PAMOATE 50 MG/1
50 CAPSULE ORAL 3 TIMES DAILY PRN
COMMUNITY
Start: 2025-04-18

## 2025-04-24 NOTE — PATIENT INSTRUCTIONS
SURVEY:    Thank you for allowing us to care for you today.    You may be receiving a survey from [facility] regarding your visit today- electronically or via mail.      Please help us by completing the survey as this will provide the needed feedback to ensure we are providing the very best care for you and your family.    If you cannot score us a very good on any question, please call the office to discuss how we could have made your experience a very good one.    Thank you.       STAFF:    Patricia Morales, Jeimy AVILES      CLINICAL STAFF:    Leila ENGLISH, Anupama AVILES, Laura AVILES, Gracie ENGLISH

## 2025-04-24 NOTE — PROGRESS NOTES
NEUROLOGY follow-up.      Patient Name:  Jese Massey  :   1970  Clinic Visit Date: 2025    I saw Mr. Jese Massey  in the neurology clinic today for migraine headaches. 54-year-old right-handed gentleman with a known history of PTSD, depression, anxiety, bipolar disorder, sleep apnea and chronic headaches.  Headaches have improved with propranolol.  LA 80 mg/day.  \"Doing very well \".  Headaches have improved in severity and duration no new symptoms.    REVIEW OF SYSTEMS    Constitutional Weight changes: absent, change in appetite: absent Fatigue: present;Fevers : absent, Any recent hospitalizations:  absent   HEENT Ears: ringing,  Visual disturbance: absent   Respiratory Shortness of breath: absent, choking:  absent, Cough: absent, Snoring : absent   Cardiovascular Chest pain: absent, Leg swelling :absent, palpitations : absent, fainting : absent   GI Constipation: absent, Diarrhea: absent, Swallowing change: absent    Urinary frequency: absent, Urinary urgency: absent, Urinary incontinence: absent   Musculoskeletal Neck pain: absent, Back pain: absent, Stiffness: absent, Muscle pain: absent, Joint pain: absent, restless leg : absent   Dermatological Hair loss: absent, Skin changes: absent   Neurological Confusion: absent, Trouble concentrating: absent, Seizures: absent;  Memory loss: absent, balance problem: absent, Dizziness: absent, vertigo: absent, Weakness: absent, Numbness absent, Tremor: absent, Spasm: absent, involuntary movement: absent, Speech difficulty: absent, Headache: present, Light sensitivity: present   Psychiatric Anxiety: present, Depression  present, drug abuse: absent, Hallucination: absent, mood disorder: absent, Suicidal ideations absent   Hematologic Abnormal bleeding: absent, Anemia: absent, Lymph gland changes: absent Clotting disorder: absent     Past Medical History:   Diagnosis Date    ADHD     Anxiety     Bipolar 1 disorder (HCC)     Migraine     PTSD

## 2025-05-07 DIAGNOSIS — G43.119 INTRACTABLE MIGRAINE WITH AURA WITHOUT STATUS MIGRAINOSUS: ICD-10-CM

## 2025-05-08 RX ORDER — RIMEGEPANT SULFATE 75 MG/75MG
TABLET, ORALLY DISINTEGRATING ORAL
Qty: 8 TABLET | Refills: 2 | Status: SHIPPED | OUTPATIENT
Start: 2025-05-08

## 2025-08-11 RX ORDER — PROPRANOLOL HYDROCHLORIDE 80 MG/1
80 CAPSULE, EXTENDED RELEASE ORAL DAILY
Qty: 30 CAPSULE | Refills: 5 | Status: SHIPPED | OUTPATIENT
Start: 2025-08-11 | End: 2025-11-09

## 2025-08-18 DIAGNOSIS — G43.119 INTRACTABLE MIGRAINE WITH AURA WITHOUT STATUS MIGRAINOSUS: ICD-10-CM

## 2025-08-21 RX ORDER — RIMEGEPANT SULFATE 75 MG/75MG
TABLET, ORALLY DISINTEGRATING ORAL
Qty: 8 TABLET | Refills: 2 | Status: SHIPPED | OUTPATIENT
Start: 2025-08-21